# Patient Record
Sex: MALE | Race: ASIAN | NOT HISPANIC OR LATINO | URBAN - METROPOLITAN AREA
[De-identification: names, ages, dates, MRNs, and addresses within clinical notes are randomized per-mention and may not be internally consistent; named-entity substitution may affect disease eponyms.]

---

## 2018-09-22 ENCOUNTER — EMERGENCY (EMERGENCY)
Facility: HOSPITAL | Age: 73
LOS: 1 days | Discharge: HOME | End: 2018-09-22
Attending: EMERGENCY MEDICINE | Admitting: INTERNAL MEDICINE
Payer: MEDICAID

## 2018-09-22 VITALS
HEART RATE: 92 BPM | SYSTOLIC BLOOD PRESSURE: 142 MMHG | DIASTOLIC BLOOD PRESSURE: 70 MMHG | TEMPERATURE: 98 F | OXYGEN SATURATION: 95 % | RESPIRATION RATE: 20 BRPM

## 2018-09-22 DIAGNOSIS — Z29.9 ENCOUNTER FOR PROPHYLACTIC MEASURES, UNSPECIFIED: ICD-10-CM

## 2018-09-22 DIAGNOSIS — I10 ESSENTIAL (PRIMARY) HYPERTENSION: ICD-10-CM

## 2018-09-22 DIAGNOSIS — N40.0 BENIGN PROSTATIC HYPERPLASIA WITHOUT LOWER URINARY TRACT SYMPTOMS: ICD-10-CM

## 2018-09-22 DIAGNOSIS — K82.9 DISEASE OF GALLBLADDER, UNSPECIFIED: Chronic | ICD-10-CM

## 2018-09-22 DIAGNOSIS — J45.909 UNSPECIFIED ASTHMA, UNCOMPLICATED: ICD-10-CM

## 2018-09-22 DIAGNOSIS — Z79.899 OTHER LONG TERM (CURRENT) DRUG THERAPY: ICD-10-CM

## 2018-09-22 DIAGNOSIS — J44.1 CHRONIC OBSTRUCTIVE PULMONARY DISEASE WITH (ACUTE) EXACERBATION: ICD-10-CM

## 2018-09-22 DIAGNOSIS — L29.9 PRURITUS, UNSPECIFIED: ICD-10-CM

## 2018-09-22 DIAGNOSIS — M25.561 PAIN IN RIGHT KNEE: ICD-10-CM

## 2018-09-22 DIAGNOSIS — E11.36 TYPE 2 DIABETES MELLITUS WITH DIABETIC CATARACT: ICD-10-CM

## 2018-09-22 LAB
ALBUMIN SERPL ELPH-MCNC: 3.6 G/DL — SIGNIFICANT CHANGE UP (ref 3.3–5)
ALP SERPL-CCNC: 94 U/L — SIGNIFICANT CHANGE UP (ref 40–120)
ALT FLD-CCNC: 11 U/L — SIGNIFICANT CHANGE UP (ref 4–41)
APPEARANCE UR: CLEAR — SIGNIFICANT CHANGE UP
APTT BLD: 36.2 SEC — SIGNIFICANT CHANGE UP (ref 27.5–37.4)
AST SERPL-CCNC: 9 U/L — SIGNIFICANT CHANGE UP (ref 4–40)
B PERT DNA SPEC QL NAA+PROBE: SIGNIFICANT CHANGE UP
BASE EXCESS BLDV CALC-SCNC: 0.5 MMOL/L — SIGNIFICANT CHANGE UP
BASOPHILS # BLD AUTO: 0.07 K/UL — SIGNIFICANT CHANGE UP (ref 0–0.2)
BASOPHILS NFR BLD AUTO: 0.7 % — SIGNIFICANT CHANGE UP (ref 0–2)
BILIRUB SERPL-MCNC: < 0.2 MG/DL — LOW (ref 0.2–1.2)
BILIRUB UR-MCNC: NEGATIVE — SIGNIFICANT CHANGE UP
BLOOD GAS VENOUS - CREATININE: 0.83 MG/DL — SIGNIFICANT CHANGE UP (ref 0.5–1.3)
BLOOD UR QL VISUAL: NEGATIVE — SIGNIFICANT CHANGE UP
BUN SERPL-MCNC: 11 MG/DL — SIGNIFICANT CHANGE UP (ref 7–23)
C PNEUM DNA SPEC QL NAA+PROBE: NOT DETECTED — SIGNIFICANT CHANGE UP
CALCIUM SERPL-MCNC: 9.1 MG/DL — SIGNIFICANT CHANGE UP (ref 8.4–10.5)
CHLORIDE BLDV-SCNC: 99 MMOL/L — SIGNIFICANT CHANGE UP (ref 96–108)
CHLORIDE SERPL-SCNC: 98 MMOL/L — SIGNIFICANT CHANGE UP (ref 98–107)
CO2 SERPL-SCNC: 22 MMOL/L — SIGNIFICANT CHANGE UP (ref 22–31)
COLOR SPEC: COLORLESS — SIGNIFICANT CHANGE UP
CREAT SERPL-MCNC: 0.99 MG/DL — SIGNIFICANT CHANGE UP (ref 0.5–1.3)
EOSINOPHIL # BLD AUTO: 0.48 K/UL — SIGNIFICANT CHANGE UP (ref 0–0.5)
EOSINOPHIL NFR BLD AUTO: 5 % — SIGNIFICANT CHANGE UP (ref 0–6)
FLUAV H1 2009 PAND RNA SPEC QL NAA+PROBE: NOT DETECTED — SIGNIFICANT CHANGE UP
FLUAV H1 RNA SPEC QL NAA+PROBE: NOT DETECTED — SIGNIFICANT CHANGE UP
FLUAV H3 RNA SPEC QL NAA+PROBE: NOT DETECTED — SIGNIFICANT CHANGE UP
FLUAV SUBTYP SPEC NAA+PROBE: SIGNIFICANT CHANGE UP
FLUBV RNA SPEC QL NAA+PROBE: NOT DETECTED — SIGNIFICANT CHANGE UP
GAS PNL BLDV: 134 MMOL/L — LOW (ref 136–146)
GLUCOSE BLDV-MCNC: 274 — HIGH (ref 70–99)
GLUCOSE SERPL-MCNC: 270 MG/DL — HIGH (ref 70–99)
GLUCOSE UR-MCNC: 150 — HIGH
HADV DNA SPEC QL NAA+PROBE: NOT DETECTED — SIGNIFICANT CHANGE UP
HCO3 BLDV-SCNC: 24 MMOL/L — SIGNIFICANT CHANGE UP (ref 20–27)
HCOV 229E RNA SPEC QL NAA+PROBE: NOT DETECTED — SIGNIFICANT CHANGE UP
HCOV HKU1 RNA SPEC QL NAA+PROBE: NOT DETECTED — SIGNIFICANT CHANGE UP
HCOV NL63 RNA SPEC QL NAA+PROBE: NOT DETECTED — SIGNIFICANT CHANGE UP
HCOV OC43 RNA SPEC QL NAA+PROBE: NOT DETECTED — SIGNIFICANT CHANGE UP
HCT VFR BLD CALC: 34.1 % — LOW (ref 39–50)
HCT VFR BLDV CALC: 34.5 % — LOW (ref 39–51)
HGB BLD-MCNC: 10.2 G/DL — LOW (ref 13–17)
HGB BLDV-MCNC: 11.2 G/DL — LOW (ref 13–17)
HMPV RNA SPEC QL NAA+PROBE: NOT DETECTED — SIGNIFICANT CHANGE UP
HPIV1 RNA SPEC QL NAA+PROBE: NOT DETECTED — SIGNIFICANT CHANGE UP
HPIV2 RNA SPEC QL NAA+PROBE: NOT DETECTED — SIGNIFICANT CHANGE UP
HPIV3 RNA SPEC QL NAA+PROBE: NOT DETECTED — SIGNIFICANT CHANGE UP
HPIV4 RNA SPEC QL NAA+PROBE: NOT DETECTED — SIGNIFICANT CHANGE UP
IMM GRANULOCYTES # BLD AUTO: 0.04 # — SIGNIFICANT CHANGE UP
IMM GRANULOCYTES NFR BLD AUTO: 0.4 % — SIGNIFICANT CHANGE UP (ref 0–1.5)
INR BLD: 0.86 — LOW (ref 0.88–1.17)
KETONES UR-MCNC: NEGATIVE — SIGNIFICANT CHANGE UP
LACTATE BLDV-MCNC: 2.1 MMOL/L — HIGH (ref 0.5–2)
LEUKOCYTE ESTERASE UR-ACNC: NEGATIVE — SIGNIFICANT CHANGE UP
LYMPHOCYTES # BLD AUTO: 2.38 K/UL — SIGNIFICANT CHANGE UP (ref 1–3.3)
LYMPHOCYTES # BLD AUTO: 25 % — SIGNIFICANT CHANGE UP (ref 13–44)
M PNEUMO DNA SPEC QL NAA+PROBE: NOT DETECTED — SIGNIFICANT CHANGE UP
MCHC RBC-ENTMCNC: 21.9 PG — LOW (ref 27–34)
MCHC RBC-ENTMCNC: 29.9 % — LOW (ref 32–36)
MCV RBC AUTO: 73.3 FL — LOW (ref 80–100)
MONOCYTES # BLD AUTO: 0.92 K/UL — HIGH (ref 0–0.9)
MONOCYTES NFR BLD AUTO: 9.7 % — SIGNIFICANT CHANGE UP (ref 2–14)
NEUTROPHILS # BLD AUTO: 5.64 K/UL — SIGNIFICANT CHANGE UP (ref 1.8–7.4)
NEUTROPHILS NFR BLD AUTO: 59.2 % — SIGNIFICANT CHANGE UP (ref 43–77)
NITRITE UR-MCNC: NEGATIVE — SIGNIFICANT CHANGE UP
NRBC # FLD: 0 — SIGNIFICANT CHANGE UP
PCO2 BLDV: 46 MMHG — SIGNIFICANT CHANGE UP (ref 41–51)
PH BLDV: 7.36 PH — SIGNIFICANT CHANGE UP (ref 7.32–7.43)
PH UR: 6.5 — SIGNIFICANT CHANGE UP (ref 5–8)
PLATELET # BLD AUTO: 350 K/UL — SIGNIFICANT CHANGE UP (ref 150–400)
PMV BLD: 10 FL — SIGNIFICANT CHANGE UP (ref 7–13)
PO2 BLDV: 33 MMHG — LOW (ref 35–40)
POTASSIUM BLDV-SCNC: 5.7 MMOL/L — HIGH (ref 3.4–4.5)
POTASSIUM SERPL-MCNC: 4.5 MMOL/L — SIGNIFICANT CHANGE UP (ref 3.5–5.3)
POTASSIUM SERPL-SCNC: 4.5 MMOL/L — SIGNIFICANT CHANGE UP (ref 3.5–5.3)
PROT SERPL-MCNC: 7.4 G/DL — SIGNIFICANT CHANGE UP (ref 6–8.3)
PROT UR-MCNC: NEGATIVE — SIGNIFICANT CHANGE UP
PROTHROM AB SERPL-ACNC: 9.9 SEC — SIGNIFICANT CHANGE UP (ref 9.8–13.1)
RBC # BLD: 4.65 M/UL — SIGNIFICANT CHANGE UP (ref 4.2–5.8)
RBC # FLD: 15.4 % — HIGH (ref 10.3–14.5)
RSV RNA SPEC QL NAA+PROBE: NOT DETECTED — SIGNIFICANT CHANGE UP
RV+EV RNA SPEC QL NAA+PROBE: POSITIVE — HIGH
SAO2 % BLDV: 55.6 % — LOW (ref 60–85)
SODIUM SERPL-SCNC: 135 MMOL/L — SIGNIFICANT CHANGE UP (ref 135–145)
SP GR SPEC: 1.01 — SIGNIFICANT CHANGE UP (ref 1–1.04)
TROPONIN T, HIGH SENSITIVITY: 12 NG/L — SIGNIFICANT CHANGE UP (ref ?–14)
TROPONIN T, HIGH SENSITIVITY: 16 NG/L — SIGNIFICANT CHANGE UP (ref ?–14)
UROBILINOGEN FLD QL: NORMAL — SIGNIFICANT CHANGE UP
WBC # BLD: 9.53 K/UL — SIGNIFICANT CHANGE UP (ref 3.8–10.5)
WBC # FLD AUTO: 9.53 K/UL — SIGNIFICANT CHANGE UP (ref 3.8–10.5)

## 2018-09-22 PROCEDURE — 99285 EMERGENCY DEPT VISIT HI MDM: CPT

## 2018-09-22 RX ORDER — SPIRONOLACTONE 25 MG/1
50 TABLET, FILM COATED ORAL DAILY
Qty: 0 | Refills: 0 | Status: DISCONTINUED | OUTPATIENT
Start: 2018-09-22 | End: 2018-09-23

## 2018-09-22 RX ORDER — SODIUM CHLORIDE 9 MG/ML
1000 INJECTION, SOLUTION INTRAVENOUS
Qty: 0 | Refills: 0 | Status: DISCONTINUED | OUTPATIENT
Start: 2018-09-22 | End: 2018-09-23

## 2018-09-22 RX ORDER — INSULIN LISPRO 100/ML
VIAL (ML) SUBCUTANEOUS AT BEDTIME
Qty: 0 | Refills: 0 | Status: DISCONTINUED | OUTPATIENT
Start: 2018-09-22 | End: 2018-09-23

## 2018-09-22 RX ORDER — INSULIN LISPRO 100/ML
VIAL (ML) SUBCUTANEOUS
Qty: 0 | Refills: 0 | Status: DISCONTINUED | OUTPATIENT
Start: 2018-09-22 | End: 2018-09-23

## 2018-09-22 RX ORDER — LOSARTAN POTASSIUM 100 MG/1
100 TABLET, FILM COATED ORAL DAILY
Qty: 0 | Refills: 0 | Status: DISCONTINUED | OUTPATIENT
Start: 2018-09-22 | End: 2018-09-23

## 2018-09-22 RX ORDER — FUROSEMIDE 40 MG
40 TABLET ORAL DAILY
Qty: 0 | Refills: 0 | Status: DISCONTINUED | OUTPATIENT
Start: 2018-09-22 | End: 2018-09-23

## 2018-09-22 RX ORDER — AZITHROMYCIN 500 MG/1
500 TABLET, FILM COATED ORAL EVERY 24 HOURS
Qty: 0 | Refills: 0 | Status: DISCONTINUED | OUTPATIENT
Start: 2018-09-23 | End: 2018-09-23

## 2018-09-22 RX ORDER — LORATADINE 10 MG/1
10 TABLET ORAL ONCE
Qty: 0 | Refills: 0 | Status: COMPLETED | OUTPATIENT
Start: 2018-09-22 | End: 2018-09-22

## 2018-09-22 RX ORDER — PANTOPRAZOLE SODIUM 20 MG/1
40 TABLET, DELAYED RELEASE ORAL
Qty: 0 | Refills: 0 | Status: DISCONTINUED | OUTPATIENT
Start: 2018-09-22 | End: 2018-09-23

## 2018-09-22 RX ORDER — INFLUENZA VIRUS VACCINE 15; 15; 15; 15 UG/.5ML; UG/.5ML; UG/.5ML; UG/.5ML
0.5 SUSPENSION INTRAMUSCULAR ONCE
Qty: 0 | Refills: 0 | Status: COMPLETED | OUTPATIENT
Start: 2018-09-22 | End: 2018-09-23

## 2018-09-22 RX ORDER — IPRATROPIUM/ALBUTEROL SULFATE 18-103MCG
3 AEROSOL WITH ADAPTER (GRAM) INHALATION
Qty: 0 | Refills: 0 | Status: COMPLETED | OUTPATIENT
Start: 2018-09-22 | End: 2018-09-22

## 2018-09-22 RX ORDER — CEFTRIAXONE 500 MG/1
1 INJECTION, POWDER, FOR SOLUTION INTRAMUSCULAR; INTRAVENOUS ONCE
Qty: 0 | Refills: 0 | Status: COMPLETED | OUTPATIENT
Start: 2018-09-22 | End: 2018-09-22

## 2018-09-22 RX ORDER — LORATADINE 10 MG/1
10 TABLET ORAL DAILY
Qty: 0 | Refills: 0 | Status: DISCONTINUED | OUTPATIENT
Start: 2018-09-22 | End: 2018-09-23

## 2018-09-22 RX ORDER — TAMSULOSIN HYDROCHLORIDE 0.4 MG/1
0.4 CAPSULE ORAL AT BEDTIME
Qty: 0 | Refills: 0 | Status: DISCONTINUED | OUTPATIENT
Start: 2018-09-22 | End: 2018-09-23

## 2018-09-22 RX ORDER — DEXTROSE 50 % IN WATER 50 %
15 SYRINGE (ML) INTRAVENOUS ONCE
Qty: 0 | Refills: 0 | Status: DISCONTINUED | OUTPATIENT
Start: 2018-09-22 | End: 2018-09-23

## 2018-09-22 RX ORDER — DEXTROSE 50 % IN WATER 50 %
25 SYRINGE (ML) INTRAVENOUS ONCE
Qty: 0 | Refills: 0 | Status: DISCONTINUED | OUTPATIENT
Start: 2018-09-22 | End: 2018-09-23

## 2018-09-22 RX ORDER — BUDESONIDE AND FORMOTEROL FUMARATE DIHYDRATE 160; 4.5 UG/1; UG/1
2 AEROSOL RESPIRATORY (INHALATION)
Qty: 0 | Refills: 0 | Status: DISCONTINUED | OUTPATIENT
Start: 2018-09-22 | End: 2018-09-23

## 2018-09-22 RX ORDER — GLUCAGON INJECTION, SOLUTION 0.5 MG/.1ML
1 INJECTION, SOLUTION SUBCUTANEOUS ONCE
Qty: 0 | Refills: 0 | Status: DISCONTINUED | OUTPATIENT
Start: 2018-09-22 | End: 2018-09-23

## 2018-09-22 RX ORDER — IPRATROPIUM/ALBUTEROL SULFATE 18-103MCG
3 AEROSOL WITH ADAPTER (GRAM) INHALATION EVERY 6 HOURS
Qty: 0 | Refills: 0 | Status: DISCONTINUED | OUTPATIENT
Start: 2018-09-22 | End: 2018-09-23

## 2018-09-22 RX ORDER — DEXTROSE 50 % IN WATER 50 %
12.5 SYRINGE (ML) INTRAVENOUS ONCE
Qty: 0 | Refills: 0 | Status: DISCONTINUED | OUTPATIENT
Start: 2018-09-22 | End: 2018-09-23

## 2018-09-22 RX ORDER — CEFTRIAXONE 500 MG/1
1 INJECTION, POWDER, FOR SOLUTION INTRAMUSCULAR; INTRAVENOUS EVERY 24 HOURS
Qty: 0 | Refills: 0 | Status: DISCONTINUED | OUTPATIENT
Start: 2018-09-23 | End: 2018-09-23

## 2018-09-22 RX ORDER — INSULIN GLARGINE 100 [IU]/ML
9 INJECTION, SOLUTION SUBCUTANEOUS AT BEDTIME
Qty: 0 | Refills: 0 | Status: DISCONTINUED | OUTPATIENT
Start: 2018-09-22 | End: 2018-09-23

## 2018-09-22 RX ORDER — MONTELUKAST 4 MG/1
10 TABLET, CHEWABLE ORAL DAILY
Qty: 0 | Refills: 0 | Status: DISCONTINUED | OUTPATIENT
Start: 2018-09-22 | End: 2018-09-23

## 2018-09-22 RX ORDER — AZITHROMYCIN 500 MG/1
500 TABLET, FILM COATED ORAL ONCE
Qty: 0 | Refills: 0 | Status: COMPLETED | OUTPATIENT
Start: 2018-09-22 | End: 2018-09-22

## 2018-09-22 RX ADMIN — CEFTRIAXONE 1 GRAM(S): 500 INJECTION, POWDER, FOR SOLUTION INTRAMUSCULAR; INTRAVENOUS at 15:04

## 2018-09-22 RX ADMIN — Medication 3 MILLILITER(S): at 13:30

## 2018-09-22 RX ADMIN — LORATADINE 10 MILLIGRAM(S): 10 TABLET ORAL at 23:05

## 2018-09-22 RX ADMIN — Medication 3 MILLILITER(S): at 13:44

## 2018-09-22 RX ADMIN — Medication 3: at 22:52

## 2018-09-22 RX ADMIN — Medication 3 MILLILITER(S): at 13:11

## 2018-09-22 RX ADMIN — Medication 125 MILLIGRAM(S): at 13:30

## 2018-09-22 RX ADMIN — INSULIN GLARGINE 9 UNIT(S): 100 INJECTION, SOLUTION SUBCUTANEOUS at 22:52

## 2018-09-22 RX ADMIN — AZITHROMYCIN 500 MILLIGRAM(S): 500 TABLET, FILM COATED ORAL at 16:30

## 2018-09-22 RX ADMIN — CEFTRIAXONE 100 GRAM(S): 500 INJECTION, POWDER, FOR SOLUTION INTRAMUSCULAR; INTRAVENOUS at 14:33

## 2018-09-22 RX ADMIN — AZITHROMYCIN 250 MILLIGRAM(S): 500 TABLET, FILM COATED ORAL at 15:30

## 2018-09-22 NOTE — H&P ADULT - NSHPPHYSICALEXAM_GEN_ALL_CORE
PHYSICAL EXAM:      Constitutional: NAD, well-groomed, well-developed  HEENT: EOMI, Normal Hearing  Neck: No LAD, No JVD  Back: Normal spine flexure, No CVA tenderness  Respiratory: CTAB  Cardiovascular: S1 and S2, RRR  Gastrointestinal: BS+, soft, NT/ND  Extremities: 1+ peripheral edema  Vascular: 2+ peripheral pulses  Neurological: A/O x 3, no focal deficits  Psychiatric: Normal mood, normal affect  Musculoskeletal: 5/5 strength b/l upper and lower extremities  Skin: mild pustular rash on posterior right leg

## 2018-09-22 NOTE — H&P ADULT - NSHPREVIEWOFSYSTEMS_GEN_ALL_CORE
Review of Systems:   CONSTITUTIONAL: No fever, weight loss  EYES: No eye pain, visual disturbances, or discharge  ENMT:  No difficulty hearing, tinnitus, vertigo; No sinus or throat pain  NECK: No pain or stiffness  RESPIRATORY: + cough, wheezing, No chills or hemoptysis; + shortness of breath  CARDIOVASCULAR: No chest pain, palpitations, dizziness, or leg swelling  GASTROINTESTINAL: No abdominal or epigastric pain. No nausea, vomiting, or hematemesis; No diarrhea or constipation. No melena or hematochezia.  GENITOURINARY: Chronic low-pressure stream/ incomplete bladder emptying  at times  NEUROLOGICAL: No headaches, memory loss, loss of strength, numbness, or tremors  SKIN :itching at right calf,  MUSCULOSKELETAL: chronic BL knee pain

## 2018-09-22 NOTE — H&P ADULT - PROBLEM SELECTOR PLAN 3
-c/w arb  -states he takes spironolactone and lasix for leg swelling; will continue as tolerated and order TTE

## 2018-09-22 NOTE — H&P ADULT - PROBLEM SELECTOR PLAN 1
-c/w nebs, abx, steroids  -c/w inhaled beta agon/steroids, Singulair  -pt on doxophylline and oral ketotifen for copd which is not carried by pharmacy  -pt will need to establish outpt care with pulmonologist and pcp as pt is coming from [facility] to live here -c/w nebs, abx, steroids  -c/w inhaled beta agon/steroids, Singulair  -pt on doxophylline and oral ketotifen for copd which is not carried by pharmacy and likely not available in [place] altogether   -pt will need to establish outpt care with pulmonologist and pcp as pt is coming from [facility] to live here

## 2018-09-22 NOTE — H&P ADULT - NSHPSOCIALHISTORY_GEN_ALL_CORE
former smoker  recently arrived from [facility] to live in [place], English speaking son at bedside

## 2018-09-22 NOTE — ED ADULT NURSE NOTE - OBJECTIVE STATEMENT
patient alert ox3 speaks Macedonian and Divehi, came in c/o cough and SOB on exertion . denies cp. not in any distress. patient recently came from [facility] one week ago. not in any distress,. family by bed side translating. labs done as ordered. awaiting results and re eval.

## 2018-09-22 NOTE — H&P ADULT - HISTORY OF PRESENT ILLNESS
74 yo Japanese speaking M who came from [facility] last week [place] live in [place]. Pt with h/o obesity, DM, BPH, leg swelling, copd/ former 30 pack year smoker who reports  quitting  about 20 years ago,   Pt reports 2 days of productive cough, sob. RVP + for entero/ rhinovirus, cxr prelim negative. Denies fevers, rigors, hemoptysis. Denies recent sick contacts. Reports significant symptomatic improvement following duoneb x1, steroids, abx all given at least 8 hours prior to my exam

## 2018-09-22 NOTE — H&P ADULT - PROBLEM SELECTOR PLAN 7
IMPROVE VTE Individual Risk Assessment    RISK                                                          Points  [] Previous VTE                                           3  [] Thrombophilia                                        2  [] Lower limb paralysis                              2   [] Current Cancer                                       2   [] Immobilization > 24 hrs                        1  [] ICU/CCU stay > 24 hours                       1  [x] Age > 60                                                   1    IMPROVE VTE Score: 1  lovenox

## 2018-09-22 NOTE — ED PROVIDER NOTE - ATTENDING CONTRIBUTION TO CARE
Attending Statement: I have reviewed and agree with all pertinent clinical information, including history and physical exam and agree with treatment plan of the PA, except as noted.  72yo M with sons at bedside for translation.  pt came from [facility] last week. Hx of HTN, HLD, Asthma pw SOB and cough x 2 days.   +productive cough, no hemoptysis. no hx of fever/chills. +sob hx of asthma last hosp a year ago. no intubation or ICU admission. Ex smoker. not on home oxygen. no chest pain. no abdominal pain. no vomit or diarrhea. hx of constipation. hx of chronic knee pain, left greater than right. no new trauma. no calf pain. no hx of PE or DVT.   Vital signs noted. pt sitting up, coughing. 95 RA, normal S1-S2 +coarse bs w end exp wheeze bs. no retractions. soft obese male nt abdomen. no pitting edema bl. no calf tenderness. AO3 w sons at bedside  plan duoneb, steroids, IV abx, cxr, labs, ekg, rvp, admit, re assess

## 2018-09-22 NOTE — ED PROVIDER NOTE - OBJECTIVE STATEMENT
73 y.o male pmhx of DM, asthma here with productive cough with white sputum since yesterday with associated SOB. Pt recently returned from [facility]. Denies fevers, chills, chest pain, abdominal pain, n/v/d, urinary symptoms. 73 y.o male pmhx of DM, asthma(on multiple inhalers) here with productive cough with white sputum since yesterday with associated SOB. Pt recently returned from [facility] last week. Denies fevers, chills, chest pain, hemoptysis, abdominal pain, n/v/d, urinary symptoms.   Prefers family at beside to translate , offered .

## 2018-09-22 NOTE — H&P ADULT - PMH
Benign prostatic hyperplasia, unspecified whether lower urinary tract symptoms present    Chronic obstructive pulmonary disease, unspecified COPD type    Essential hypertension    Type 2 diabetes mellitus with complication, unspecified whether long term insulin use

## 2018-09-22 NOTE — H&P ADULT - PROBLEM SELECTOR PLAN 8
med rec based on physical meds provided by son; includes doxophylline and oral ketotifen for asthma which I cant include as they are not in med rec system

## 2018-09-22 NOTE — ED PROVIDER NOTE - PROGRESS NOTE DETAILS
pt looks more comfortable w family. pending repeat trop and admission for asthma exacerbation/bronchitis. Plan dw w pt and sons.  Endorsed to Dr Echevarria. monique mercado: pt doing better, + enterovirus, trop stable. accepted by pulm fellow to PCU unit.

## 2018-09-22 NOTE — H&P ADULT - NSHPLABSRESULTS_GEN_ALL_CORE
Vital Signs Last 24 Hrs  T(C): 37.1 (22 Sep 2018 21:16), Max: 37.1 (22 Sep 2018 13:20)  T(F): 98.7 (22 Sep 2018 21:16), Max: 98.8 (22 Sep 2018 13:20)  HR: 97 (22 Sep 2018 21:16) (86 - 112)  BP: 147/86 (22 Sep 2018 21:16) (128/50 - 186/84)  BP(mean): --  RR: 19 (22 Sep 2018 21:16) (19 - 25)  SpO2: 98% (22 Sep 2018 21:16) (95% - 98%)                            10.2   9.53  )-----------( 350      ( 22 Sep 2018 11:45 )             34.1     -    135  |  98  |  11  ----------------------------<  270<H>  4.5   |  22  |  0.99    Ca    9.1      22 Sep 2018 11:45    TPro  7.4  /  Alb  3.6  /  TBili  < 0.2<L>  /  DBili  x   /  AST  9   /  ALT  11  /  AlkPhos  94  -    CAPILLARY BLOOD GLUCOSE      POCT Blood Glucose.: 366 mg/dL (22 Sep 2018 21:54)    PT/INR - ( 22 Sep 2018 11:45 )   PT: 9.9 SEC;   INR: 0.86          PTT - ( 22 Sep 2018 11:45 )  PTT:36.2 SEC  Urinalysis Basic - ( 22 Sep 2018 13:45 )    Color: COLORLESS / Appearance: CLEAR / S.008 / pH: 6.5  Gluc: 150 / Ketone: NEGATIVE  / Bili: NEGATIVE / Urobili: NORMAL   Blood: NEGATIVE / Protein: NEGATIVE / Nitrite: NEGATIVE   Leuk Esterase: NEGATIVE / RBC: x / WBC x   Sq Epi: x / Non Sq Epi: x / Bacteria: x

## 2018-09-22 NOTE — H&P ADULT - PROBLEM SELECTOR PLAN 2
-reports taking insulin 18 in morning and 24 evening; unsure of name or if a mixture  -will place on lantus and ISS for now pending clarification and response to our regimen

## 2018-09-22 NOTE — ED ADULT TRIAGE NOTE - CHIEF COMPLAINT QUOTE
Came from [facility] 1 week ago, over past 3 days has been having SOB and cough. Hx: DM, HTN, COPD, states he is running out of his medication. Denies CP. Respirations even/unlabored.

## 2018-09-22 NOTE — ED ADULT NURSE NOTE - CHIEF COMPLAINT QUOTE
Came from [facility] 1 week ago, over past 3 days has been having SOB and cough. Hx: DM, HTN, COPD, states he is running out of his medication. Denies CP. Respirations even/unlabored.

## 2018-09-23 VITALS
DIASTOLIC BLOOD PRESSURE: 78 MMHG | TEMPERATURE: 98 F | RESPIRATION RATE: 18 BRPM | OXYGEN SATURATION: 97 % | HEART RATE: 98 BPM | SYSTOLIC BLOOD PRESSURE: 129 MMHG

## 2018-09-23 DIAGNOSIS — J45.909 UNSPECIFIED ASTHMA, UNCOMPLICATED: ICD-10-CM

## 2018-09-23 LAB
BACTERIA UR CULT: SIGNIFICANT CHANGE UP
CHOLEST SERPL-MCNC: 224 MG/DL — HIGH (ref 120–199)
GLUCOSE BLDC GLUCOMTR-MCNC: 333 MG/DL — HIGH (ref 70–99)
GLUCOSE BLDC GLUCOMTR-MCNC: 386 MG/DL — HIGH (ref 70–99)
GLUCOSE BLDC GLUCOMTR-MCNC: 397 MG/DL — HIGH (ref 70–99)
HBA1C BLD-MCNC: 9.8 % — HIGH (ref 4–5.6)
HCT VFR BLD CALC: 33.9 % — LOW (ref 39–50)
HDLC SERPL-MCNC: 48 MG/DL — SIGNIFICANT CHANGE UP (ref 35–55)
HGB BLD-MCNC: 10.3 G/DL — LOW (ref 13–17)
LIPID PNL WITH DIRECT LDL SERPL: 178 MG/DL — SIGNIFICANT CHANGE UP
MCHC RBC-ENTMCNC: 21.8 PG — LOW (ref 27–34)
MCHC RBC-ENTMCNC: 30.4 % — LOW (ref 32–36)
MCV RBC AUTO: 71.7 FL — LOW (ref 80–100)
NRBC # FLD: 0 — SIGNIFICANT CHANGE UP
PLATELET # BLD AUTO: 359 K/UL — SIGNIFICANT CHANGE UP (ref 150–400)
PMV BLD: 10.4 FL — SIGNIFICANT CHANGE UP (ref 7–13)
RBC # BLD: 4.73 M/UL — SIGNIFICANT CHANGE UP (ref 4.2–5.8)
RBC # FLD: 14.9 % — HIGH (ref 10.3–14.5)
SPECIMEN SOURCE: SIGNIFICANT CHANGE UP
TRIGL SERPL-MCNC: 115 MG/DL — SIGNIFICANT CHANGE UP (ref 10–149)
WBC # BLD: 11.44 K/UL — HIGH (ref 3.8–10.5)
WBC # FLD AUTO: 11.44 K/UL — HIGH (ref 3.8–10.5)

## 2018-09-23 RX ORDER — FLUTICASONE PROPIONATE AND SALMETEROL 50; 250 UG/1; UG/1
1 POWDER ORAL; RESPIRATORY (INHALATION)
Qty: 1 | Refills: 0 | OUTPATIENT
Start: 2018-09-23 | End: 2018-10-22

## 2018-09-23 RX ORDER — OLMESARTAN MEDOXOMIL 5 MG/1
1 TABLET, FILM COATED ORAL
Qty: 30 | Refills: 0 | OUTPATIENT
Start: 2018-09-23 | End: 2018-10-22

## 2018-09-23 RX ORDER — MONTELUKAST 4 MG/1
1 TABLET, CHEWABLE ORAL
Qty: 30 | Refills: 0 | OUTPATIENT
Start: 2018-09-23 | End: 2018-10-22

## 2018-09-23 RX ORDER — SPIRONOLACTONE 25 MG/1
1 TABLET, FILM COATED ORAL
Qty: 30 | Refills: 0 | OUTPATIENT
Start: 2018-09-23 | End: 2018-10-22

## 2018-09-23 RX ORDER — FUROSEMIDE 40 MG
1 TABLET ORAL
Qty: 30 | Refills: 0 | OUTPATIENT
Start: 2018-09-23 | End: 2018-10-22

## 2018-09-23 RX ORDER — LORATADINE 10 MG/1
1 TABLET ORAL
Qty: 0 | Refills: 0 | COMMUNITY
Start: 2018-09-23

## 2018-09-23 RX ORDER — TAMSULOSIN HYDROCHLORIDE 0.4 MG/1
1 CAPSULE ORAL
Qty: 30 | Refills: 0 | OUTPATIENT
Start: 2018-09-23 | End: 2018-10-22

## 2018-09-23 RX ORDER — INSULIN GLARGINE 100 [IU]/ML
18 INJECTION, SOLUTION SUBCUTANEOUS
Qty: 10 | Refills: 0 | OUTPATIENT
Start: 2018-09-23 | End: 2018-10-22

## 2018-09-23 RX ADMIN — Medication 40 MILLIGRAM(S): at 05:52

## 2018-09-23 RX ADMIN — Medication 3 MILLILITER(S): at 16:25

## 2018-09-23 RX ADMIN — CEFTRIAXONE 100 GRAM(S): 500 INJECTION, POWDER, FOR SOLUTION INTRAMUSCULAR; INTRAVENOUS at 00:48

## 2018-09-23 RX ADMIN — PANTOPRAZOLE SODIUM 40 MILLIGRAM(S): 20 TABLET, DELAYED RELEASE ORAL at 05:52

## 2018-09-23 RX ADMIN — AZITHROMYCIN 250 MILLIGRAM(S): 500 TABLET, FILM COATED ORAL at 00:48

## 2018-09-23 RX ADMIN — Medication 5: at 12:23

## 2018-09-23 RX ADMIN — LOSARTAN POTASSIUM 100 MILLIGRAM(S): 100 TABLET, FILM COATED ORAL at 05:52

## 2018-09-23 RX ADMIN — INFLUENZA VIRUS VACCINE 0.5 MILLILITER(S): 15; 15; 15; 15 SUSPENSION INTRAMUSCULAR at 18:26

## 2018-09-23 RX ADMIN — Medication 3 MILLILITER(S): at 02:46

## 2018-09-23 RX ADMIN — Medication 3 MILLILITER(S): at 10:30

## 2018-09-23 RX ADMIN — SPIRONOLACTONE 50 MILLIGRAM(S): 25 TABLET, FILM COATED ORAL at 06:44

## 2018-09-23 RX ADMIN — Medication 5: at 18:37

## 2018-09-23 RX ADMIN — Medication 4: at 08:13

## 2018-09-23 NOTE — DISCHARGE NOTE ADULT - MEDICATION SUMMARY - MEDICATIONS TO TAKE
I will START or STAY ON the medications listed below when I get home from the hospital:    predniSONE 20 mg oral tablet  -- 2 tab(s) by mouth once a day  -- Indication: For Asthma    spironolactone 50 mg oral tablet  -- 1 tab(s) by mouth once a day  -- Indication: For Essential hypertension    olmesartan 40 mg oral tablet  -- 1 tab(s) by mouth once a day  -- Indication: For Essential hypertension    tamsulosin 0.4 mg oral capsule  -- 1 cap(s) by mouth once a day  -- Indication: For Benign prostatic hyperplasia, unspecified whether lower urinary tract symptoms present    Lantus 100 units/mL subcutaneous solution  -- 18 unit(s) subcutaneous once a day (at bedtime)   -- Do not drink alcoholic beverages when taking this medication.  It is very important that you take or use this exactly as directed.  Do not skip doses or discontinue unless directed by your doctor.  Keep in refrigerator.  Do not freeze.    -- Indication: For Type 2 diabetes mellitus with complication, unspecified whether long term insulin use    loratadine 10 mg oral tablet, disintegrating  -- 1 tab(s) by mouth once a day, As needed, pruritus  -- Indication: For Asthma    Advair Diskus 250 mcg-50 mcg inhalation powder  -- 1 puff(s) inhaled 2 times a day   -- Check with your doctor before becoming pregnant.  For inhalation only.  Obtain medical advice before taking any non-prescription drugs as some may affect the action of this medication.  Rinse mouth thoroughly after use.    -- Indication: For Asthma    Lasix 40 mg oral tablet  -- 1 tab(s) by mouth once a day   -- Avoid prolonged or excessive exposure to direct and/or artificial sunlight while taking this medication.  It is very important that you take or use this exactly as directed.  Do not skip doses or discontinue unless directed by your doctor.  It may be advisable to drink a full glass orange juice or eat a banana daily while taking this medication.    -- Indication: For Essential hypertension    Singulair 10 mg oral tablet  -- 1 tab(s) by mouth once a day  -- Indication: For Asthma    NexIUM  -- 20 milligram(s) by mouth once a day  -- Indication: For Gerd

## 2018-09-23 NOTE — DISCHARGE NOTE ADULT - CARE PROVIDERS DIRECT ADDRESSES
[email or web address]

## 2018-09-23 NOTE — DISCHARGE NOTE ADULT - ADDITIONAL INSTRUCTIONS
Follow up with primary care provider in 1-2 weeks  Follow up with pulmonologist in 1-2 weeks Follow up with primary care provider in 1-2 weeks. Consider an echocardiogram.  Follow up with pulmonologist in 1-2 weeks

## 2018-09-23 NOTE — DISCHARGE NOTE ADULT - PATIENT PORTAL LINK FT
You can access the [facility] Patient Portal, offered by [facility], by registering with the following website: [email or web address]

## 2018-09-23 NOTE — DISCHARGE NOTE ADULT - CARE PLAN
Principal Discharge DX:	Asthma  Goal:	Maintain optimal respiratory status  Assessment and plan of treatment:	Complete course of prednisone  Continue singulair, advair- take every day even if you are feeling well, albuterol as needed  Secondary Diagnosis:	Benign prostatic hyperplasia, unspecified whether lower urinary tract symptoms present  Assessment and plan of treatment:	Continue flomax  Secondary Diagnosis:	Essential hypertension  Goal:	Maintain BP<130/80  Assessment and plan of treatment:	Continue Losartan, lasix, aldactone  Secondary Diagnosis:	Type 2 diabetes mellitus with diabetic cataract, unspecified whether long term insulin use  Goal:	Maintain glucose 100-180  Assessment and plan of treatment:	Continue insulin

## 2018-09-23 NOTE — PROGRESS NOTE ADULT - ASSESSMENT
74 yo Yakut speaking M who came from [facility] last week [place] live [place]. Pt with h/o obesity, DM, BPH, leg swelling, copd/ former 30 pack year smoker who reports  quitting  about 20 years ago,   Pt reports 2 days of productive cough, sob. RVP + for entero/ rhinovirus, cxr prelim negative. Denies fevers, rigors, hemoptysis. Denies recent sick contacts. Reports significant symptomatic improvement following duonebs and steroids.    RVP positive for RV/EV. Treated with ceftriaxone and azithromycin, duonebs, symbicort, steroids, lasix, and aldactone.

## 2018-09-23 NOTE — DISCHARGE NOTE ADULT - CARE PROVIDER_API CALL
Lisker, Gita N (MD), Medicine  Pulmonary Medicine  [address] [place] [address]  Phone: [phone]  Fax: [phone]    Pablo Matamoros  Phone: (   )    -  Fax: (   )    -

## 2018-09-23 NOTE — DISCHARGE NOTE ADULT - SECONDARY DIAGNOSIS.
Benign prostatic hyperplasia, unspecified whether lower urinary tract symptoms present Essential hypertension Type 2 diabetes mellitus with diabetic cataract, unspecified whether long term insulin use

## 2018-09-23 NOTE — DISCHARGE NOTE ADULT - HOSPITAL COURSE
72 yo Korean speaking M who came from [facility] last week [place] live [place]. Pt with h/o obesity, DM, BPH, leg swelling, copd/ former 30 pack year smoker who reports  quitting  about 20 years ago,   Pt reports 2 days of productive cough, sob. RVP + for entero/ rhinovirus, cxr prelim negative. Denies fevers, rigors, hemoptysis. Denies recent sick contacts. Reports significant symptomatic improvement following duonebs and steroids.

## 2018-09-23 NOTE — DISCHARGE NOTE ADULT - PLAN OF CARE
Maintain optimal respiratory status Complete course of prednisone  Continue singulair, advair- take every day even if you are feeling well, albuterol as needed Continue flomax Maintain BP<130/80 Continue Losartan, lasix, aldactone Maintain glucose 100-180 Continue insulin

## 2018-09-23 NOTE — PROGRESS NOTE ADULT - SUBJECTIVE AND OBJECTIVE BOX
CHIEF COMPLAINT:    Interval Events:    REVIEW OF SYSTEMS:  Constitutional:   Eyes:  ENT:  CV:  Resp:  GI:  :  MSK:  Integumentary:  Neurological:  Psychiatric:  Endocrine:  Hematologic/Lymphatic:  Allergic/Immunologic:  [ ] All other systems negative  [ ] Unable to assess ROS because ________    OBJECTIVE:  ICU Vital Signs Last 24 Hrs  T(C): 36.4 (23 Sep 2018 05:50), Max: 37.1 (22 Sep 2018 13:20)  T(F): 97.5 (23 Sep 2018 05:50), Max: 98.8 (22 Sep 2018 13:20)  HR: 93 (23 Sep 2018 05:50) (86 - 112)  BP: 149/84 (23 Sep 2018 05:50) (128/50 - 186/84)  BP(mean): --  ABP: --  ABP(mean): --  RR: 19 (23 Sep 2018 05:50) (19 - 25)  SpO2: 97% (23 Sep 2018 05:50) (95% - 100%)         @ 07:01  -   @ 07:00  --------------------------------------------------------  IN: 850 mL / OUT: 0 mL / NET: 850 mL      CAPILLARY BLOOD GLUCOSE      POCT Blood Glucose.: 333 mg/dL (23 Sep 2018 07:52)      PHYSICAL EXAM:  General:   HEENT:   Lymph Nodes:  Neck:   Respiratory:   Cardiovascular:   Abdomen:   Extremities:   Skin:   Neurological:  Psychiatry:    HOSPITAL MEDICATIONS:  MEDICATIONS  (STANDING):  ALBUTerol/ipratropium for Nebulization 3 milliLiter(s) Nebulizer every 6 hours  azithromycin  IVPB 500 milliGRAM(s) IV Intermittent every 24 hours  buDESOnide 160 MICROgram(s)/formoterol 4.5 MICROgram(s) Inhaler 2 Puff(s) Inhalation two times a day  cefTRIAXone   IVPB 1 Gram(s) IV Intermittent every 24 hours  dextrose 5%. 1000 milliLiter(s) (50 mL/Hr) IV Continuous <Continuous>  dextrose 50% Injectable 12.5 Gram(s) IV Push once  dextrose 50% Injectable 25 Gram(s) IV Push once  dextrose 50% Injectable 25 Gram(s) IV Push once  furosemide    Tablet 40 milliGRAM(s) Oral daily  influenza   Vaccine 0.5 milliLiter(s) IntraMuscular once  insulin glargine Injectable (LANTUS) 9 Unit(s) SubCutaneous at bedtime  insulin lispro (HumaLOG) corrective regimen sliding scale   SubCutaneous three times a day before meals  insulin lispro (HumaLOG) corrective regimen sliding scale   SubCutaneous at bedtime  losartan 100 milliGRAM(s) Oral daily  montelukast 10 milliGRAM(s) Oral daily  pantoprazole    Tablet 40 milliGRAM(s) Oral before breakfast  predniSONE   Tablet 40 milliGRAM(s) Oral daily  spironolactone 50 milliGRAM(s) Oral daily  tamsulosin 0.4 milliGRAM(s) Oral at bedtime    MEDICATIONS  (PRN):  dextrose 40% Gel 15 Gram(s) Oral once PRN Blood Glucose LESS THAN 70 milliGRAM(s)/deciliter  glucagon  Injectable 1 milliGRAM(s) IntraMuscular once PRN Glucose LESS THAN 70 milligrams/deciliter  loratadine Disintegrating Tablet 10 milliGRAM(s) Oral daily PRN pruritus      LABS:                        10.3   11.44 )-----------( 359      ( 23 Sep 2018 05:30 )             33.9         135  |  98  |  11  ----------------------------<  270<H>  4.5   |  22  |  0.99    Ca    9.1      22 Sep 2018 11:45    TPro  7.4  /  Alb  3.6  /  TBili  < 0.2<L>  /  DBili  x   /  AST  9   /  ALT  11  /  AlkPhos  94      PT/INR - ( 22 Sep 2018 11:45 )   PT: 9.9 SEC;   INR: 0.86          PTT - ( 22 Sep 2018 11:45 )  PTT:36.2 SEC  Urinalysis Basic - ( 22 Sep 2018 13:45 )    Color: COLORLESS / Appearance: CLEAR / S.008 / pH: 6.5  Gluc: 150 / Ketone: NEGATIVE  / Bili: NEGATIVE / Urobili: NORMAL   Blood: NEGATIVE / Protein: NEGATIVE / Nitrite: NEGATIVE   Leuk Esterase: NEGATIVE / RBC: x / WBC x   Sq Epi: x / Non Sq Epi: x / Bacteria: x        Venous Blood Gas:   @ 11:45  7.36/46/33/24/55.6  VBG Lactate: 2.1      MICROBIOLOGY:     RADIOLOGY:  [ ] Reviewed and interpreted by me    PULMONARY FUNCTION TESTS:    EKG: CHIEF COMPLAINT: Patient is a 73y old  Male who presents with a chief complaint of copd exacerbation (23 Sep 2018 13:36)    Interval Events: Admitted overnight    OBJECTIVE:  ICU Vital Signs Last 24 Hrs  T(C): 36.4 (23 Sep 2018 05:50), Max: 37.1 (22 Sep 2018 13:20)  T(F): 97.5 (23 Sep 2018 05:50), Max: 98.8 (22 Sep 2018 13:20)  HR: 93 (23 Sep 2018 05:50) (86 - 112)  BP: 149/84 (23 Sep 2018 05:50) (128/50 - 186/84)  BP(mean): --  ABP: --  ABP(mean): --  RR: 19 (23 Sep 2018 05:50) (19 - 25)  SpO2: 97% (23 Sep 2018 05:50) (95% - 100%)         @ 07:01  -   @ 07:00  --------------------------------------------------------  IN: 850 mL / OUT: 0 mL / NET: 850 mL      CAPILLARY BLOOD GLUCOSE      POCT Blood Glucose.: 333 mg/dL (23 Sep 2018 07:52)    HOSPITAL MEDICATIONS:  MEDICATIONS  (STANDING):  ALBUTerol/ipratropium for Nebulization 3 milliLiter(s) Nebulizer every 6 hours  azithromycin  IVPB 500 milliGRAM(s) IV Intermittent every 24 hours  buDESOnide 160 MICROgram(s)/formoterol 4.5 MICROgram(s) Inhaler 2 Puff(s) Inhalation two times a day  cefTRIAXone   IVPB 1 Gram(s) IV Intermittent every 24 hours  dextrose 5%. 1000 milliLiter(s) (50 mL/Hr) IV Continuous <Continuous>  dextrose 50% Injectable 12.5 Gram(s) IV Push once  dextrose 50% Injectable 25 Gram(s) IV Push once  dextrose 50% Injectable 25 Gram(s) IV Push once  furosemide    Tablet 40 milliGRAM(s) Oral daily  influenza   Vaccine 0.5 milliLiter(s) IntraMuscular once  insulin glargine Injectable (LANTUS) 9 Unit(s) SubCutaneous at bedtime  insulin lispro (HumaLOG) corrective regimen sliding scale   SubCutaneous three times a day before meals  insulin lispro (HumaLOG) corrective regimen sliding scale   SubCutaneous at bedtime  losartan 100 milliGRAM(s) Oral daily  montelukast 10 milliGRAM(s) Oral daily  pantoprazole    Tablet 40 milliGRAM(s) Oral before breakfast  predniSONE   Tablet 40 milliGRAM(s) Oral daily  spironolactone 50 milliGRAM(s) Oral daily  tamsulosin 0.4 milliGRAM(s) Oral at bedtime    MEDICATIONS  (PRN):  dextrose 40% Gel 15 Gram(s) Oral once PRN Blood Glucose LESS THAN 70 milliGRAM(s)/deciliter  glucagon  Injectable 1 milliGRAM(s) IntraMuscular once PRN Glucose LESS THAN 70 milligrams/deciliter  loratadine Disintegrating Tablet 10 milliGRAM(s) Oral daily PRN pruritus      LABS:                        10.3   11.44 )-----------( 359      ( 23 Sep 2018 05:30 )             33.9         135  |  98  |  11  ----------------------------<  270<H>  4.5   |  22  |  0.99    Ca    9.1      22 Sep 2018 11:45    TPro  7.4  /  Alb  3.6  /  TBili  < 0.2<L>  /  DBili  x   /  AST  9   /  ALT  11  /  AlkPhos  94      PT/INR - ( 22 Sep 2018 11:45 )   PT: 9.9 SEC;   INR: 0.86          PTT - ( 22 Sep 2018 11:45 )  PTT:36.2 SEC  Urinalysis Basic - ( 22 Sep 2018 13:45 )    Color: COLORLESS / Appearance: CLEAR / S.008 / pH: 6.5  Gluc: 150 / Ketone: NEGATIVE  / Bili: NEGATIVE / Urobili: NORMAL   Blood: NEGATIVE / Protein: NEGATIVE / Nitrite: NEGATIVE   Leuk Esterase: NEGATIVE / RBC: x / WBC x   Sq Epi: x / Non Sq Epi: x / Bacteria: x        Venous Blood Gas:   @ 11:45  7.36/46/33/24/55.6  VBG Lactate: 2.1      MICROBIOLOGY:     RADIOLOGY:  [ ] Reviewed and interpreted by me    PULMONARY FUNCTION TESTS:    EKG:

## 2018-09-27 LAB
BACTERIA BLD CULT: SIGNIFICANT CHANGE UP
BACTERIA BLD CULT: SIGNIFICANT CHANGE UP

## 2018-09-27 RX ORDER — SPIRONOLACTONE 25 MG/1
1 TABLET, FILM COATED ORAL
Qty: 0 | Refills: 0 | COMMUNITY

## 2018-09-27 RX ORDER — OLMESARTAN MEDOXOMIL 5 MG/1
1 TABLET, FILM COATED ORAL
Qty: 0 | Refills: 0 | COMMUNITY

## 2018-09-27 RX ORDER — MONTELUKAST 4 MG/1
1 TABLET, CHEWABLE ORAL
Qty: 0 | Refills: 0 | COMMUNITY

## 2018-09-27 RX ORDER — FLUTICASONE PROPIONATE AND SALMETEROL 50; 250 UG/1; UG/1
0 POWDER ORAL; RESPIRATORY (INHALATION)
Qty: 0 | Refills: 0 | COMMUNITY

## 2018-09-27 RX ORDER — FUROSEMIDE 40 MG
40 TABLET ORAL
Qty: 0 | Refills: 0 | COMMUNITY

## 2018-09-27 RX ORDER — TAMSULOSIN HYDROCHLORIDE 0.4 MG/1
1 CAPSULE ORAL
Qty: 0 | Refills: 0 | COMMUNITY

## 2018-09-29 ENCOUNTER — INPATIENT (INPATIENT)
Facility: HOSPITAL | Age: 73
LOS: 0 days | Discharge: HOME | End: 2018-09-30
Attending: HOSPITALIST | Admitting: HOSPITALIST
Payer: MEDICAID

## 2018-09-29 VITALS
HEART RATE: 95 BPM | TEMPERATURE: 97 F | RESPIRATION RATE: 16 BRPM | OXYGEN SATURATION: 98 % | DIASTOLIC BLOOD PRESSURE: 88 MMHG | SYSTOLIC BLOOD PRESSURE: 191 MMHG

## 2018-09-29 DIAGNOSIS — K82.9 DISEASE OF GALLBLADDER, UNSPECIFIED: Chronic | ICD-10-CM

## 2018-09-29 DIAGNOSIS — Z90.49 ACQUIRED ABSENCE OF OTHER SPECIFIED PARTS OF DIGESTIVE TRACT: Chronic | ICD-10-CM

## 2018-09-29 DIAGNOSIS — R07.9 CHEST PAIN, UNSPECIFIED: ICD-10-CM

## 2018-09-29 DIAGNOSIS — I10 ESSENTIAL (PRIMARY) HYPERTENSION: ICD-10-CM

## 2018-09-29 DIAGNOSIS — J44.9 CHRONIC OBSTRUCTIVE PULMONARY DISEASE, UNSPECIFIED: ICD-10-CM

## 2018-09-29 DIAGNOSIS — E11.8 TYPE 2 DIABETES MELLITUS WITH UNSPECIFIED COMPLICATIONS: ICD-10-CM

## 2018-09-29 PROBLEM — N40.0 BENIGN PROSTATIC HYPERPLASIA WITHOUT LOWER URINARY TRACT SYMPTOMS: Chronic | Status: ACTIVE | Noted: 2018-09-22

## 2018-09-29 LAB
ALBUMIN SERPL ELPH-MCNC: 3.6 G/DL — SIGNIFICANT CHANGE UP (ref 3.3–5)
ALP SERPL-CCNC: 89 U/L — SIGNIFICANT CHANGE UP (ref 40–120)
ALT FLD-CCNC: 10 U/L — SIGNIFICANT CHANGE UP (ref 4–41)
AMYLASE P1 CFR SERPL: 75 U/L — SIGNIFICANT CHANGE UP (ref 25–125)
APPEARANCE UR: CLEAR — SIGNIFICANT CHANGE UP
AST SERPL-CCNC: 8 U/L — SIGNIFICANT CHANGE UP (ref 4–40)
BACTERIA # UR AUTO: NEGATIVE — SIGNIFICANT CHANGE UP
BASOPHILS # BLD AUTO: 0.04 K/UL — SIGNIFICANT CHANGE UP (ref 0–0.2)
BASOPHILS # BLD AUTO: 0.05 K/UL — SIGNIFICANT CHANGE UP (ref 0–0.2)
BASOPHILS NFR BLD AUTO: 0.2 % — SIGNIFICANT CHANGE UP (ref 0–2)
BASOPHILS NFR BLD AUTO: 0.3 % — SIGNIFICANT CHANGE UP (ref 0–2)
BILIRUB SERPL-MCNC: 0.5 MG/DL — SIGNIFICANT CHANGE UP (ref 0.2–1.2)
BILIRUB UR-MCNC: NEGATIVE — SIGNIFICANT CHANGE UP
BLOOD UR QL VISUAL: NEGATIVE — SIGNIFICANT CHANGE UP
BUN SERPL-MCNC: 25 MG/DL — HIGH (ref 7–23)
CALCIUM SERPL-MCNC: 9.2 MG/DL — SIGNIFICANT CHANGE UP (ref 8.4–10.5)
CHLORIDE SERPL-SCNC: 97 MMOL/L — LOW (ref 98–107)
CO2 SERPL-SCNC: 20 MMOL/L — LOW (ref 22–31)
COLOR SPEC: YELLOW — SIGNIFICANT CHANGE UP
CREAT SERPL-MCNC: 1.07 MG/DL — SIGNIFICANT CHANGE UP (ref 0.5–1.3)
EOSINOPHIL # BLD AUTO: 0.14 K/UL — SIGNIFICANT CHANGE UP (ref 0–0.5)
EOSINOPHIL # BLD AUTO: 0.3 K/UL — SIGNIFICANT CHANGE UP (ref 0–0.5)
EOSINOPHIL NFR BLD AUTO: 0.9 % — SIGNIFICANT CHANGE UP (ref 0–6)
EOSINOPHIL NFR BLD AUTO: 1.5 % — SIGNIFICANT CHANGE UP (ref 0–6)
GLUCOSE SERPL-MCNC: 100 MG/DL — HIGH (ref 70–99)
GLUCOSE UR-MCNC: NEGATIVE — SIGNIFICANT CHANGE UP
HCT VFR BLD CALC: 34.7 % — LOW (ref 39–50)
HCT VFR BLD CALC: 36.6 % — LOW (ref 39–50)
HCT VFR BLD CALC: 36.6 % — LOW (ref 39–50)
HGB BLD-MCNC: 10.5 G/DL — LOW (ref 13–17)
HGB BLD-MCNC: 11.1 G/DL — LOW (ref 13–17)
HGB BLD-MCNC: 11.1 G/DL — LOW (ref 13–17)
HYALINE CASTS # UR AUTO: SIGNIFICANT CHANGE UP
IMM GRANULOCYTES # BLD AUTO: 0.11 # — SIGNIFICANT CHANGE UP
IMM GRANULOCYTES # BLD AUTO: 0.13 # — SIGNIFICANT CHANGE UP
IMM GRANULOCYTES NFR BLD AUTO: 0.6 % — SIGNIFICANT CHANGE UP (ref 0–1.5)
IMM GRANULOCYTES NFR BLD AUTO: 0.7 % — SIGNIFICANT CHANGE UP (ref 0–1.5)
KETONES UR-MCNC: SIGNIFICANT CHANGE UP
LACTATE SERPL-SCNC: 0.9 MMOL/L — SIGNIFICANT CHANGE UP (ref 0.5–2)
LEUKOCYTE ESTERASE UR-ACNC: NEGATIVE — SIGNIFICANT CHANGE UP
LIDOCAIN IGE QN: 28.9 U/L — SIGNIFICANT CHANGE UP (ref 7–60)
LYMPHOCYTES # BLD AUTO: 15.2 % — SIGNIFICANT CHANGE UP (ref 13–44)
LYMPHOCYTES # BLD AUTO: 16 % — SIGNIFICANT CHANGE UP (ref 13–44)
LYMPHOCYTES # BLD AUTO: 2.51 K/UL — SIGNIFICANT CHANGE UP (ref 1–3.3)
LYMPHOCYTES # BLD AUTO: 3.06 K/UL — SIGNIFICANT CHANGE UP (ref 1–3.3)
MCHC RBC-ENTMCNC: 22.1 PG — LOW (ref 27–34)
MCHC RBC-ENTMCNC: 22.1 PG — LOW (ref 27–34)
MCHC RBC-ENTMCNC: 22.2 PG — LOW (ref 27–34)
MCHC RBC-ENTMCNC: 30.3 % — LOW (ref 32–36)
MCV RBC AUTO: 72.8 FL — LOW (ref 80–100)
MCV RBC AUTO: 72.8 FL — LOW (ref 80–100)
MCV RBC AUTO: 73.2 FL — LOW (ref 80–100)
MONOCYTES # BLD AUTO: 1.22 K/UL — HIGH (ref 0–0.9)
MONOCYTES # BLD AUTO: 1.75 K/UL — HIGH (ref 0–0.9)
MONOCYTES NFR BLD AUTO: 7.8 % — SIGNIFICANT CHANGE UP (ref 2–14)
MONOCYTES NFR BLD AUTO: 8.7 % — SIGNIFICANT CHANGE UP (ref 2–14)
NEUTROPHILS # BLD AUTO: 11.71 K/UL — HIGH (ref 1.8–7.4)
NEUTROPHILS # BLD AUTO: 14.82 K/UL — HIGH (ref 1.8–7.4)
NEUTROPHILS NFR BLD AUTO: 73.8 % — SIGNIFICANT CHANGE UP (ref 43–77)
NEUTROPHILS NFR BLD AUTO: 74.3 % — SIGNIFICANT CHANGE UP (ref 43–77)
NITRITE UR-MCNC: NEGATIVE — SIGNIFICANT CHANGE UP
NRBC # FLD: 0 — SIGNIFICANT CHANGE UP
PH UR: 6.5 — SIGNIFICANT CHANGE UP (ref 5–8)
PLATELET # BLD AUTO: 315 K/UL — SIGNIFICANT CHANGE UP (ref 150–400)
PLATELET # BLD AUTO: 324 K/UL — SIGNIFICANT CHANGE UP (ref 150–400)
PLATELET # BLD AUTO: 324 K/UL — SIGNIFICANT CHANGE UP (ref 150–400)
PMV BLD: 10.1 FL — SIGNIFICANT CHANGE UP (ref 7–13)
POTASSIUM SERPL-MCNC: 4 MMOL/L — SIGNIFICANT CHANGE UP (ref 3.5–5.3)
POTASSIUM SERPL-SCNC: 4 MMOL/L — SIGNIFICANT CHANGE UP (ref 3.5–5.3)
PROT SERPL-MCNC: 7.2 G/DL — SIGNIFICANT CHANGE UP (ref 6–8.3)
PROT UR-MCNC: 30 — SIGNIFICANT CHANGE UP
RBC # BLD: 4.74 M/UL — SIGNIFICANT CHANGE UP (ref 4.2–5.8)
RBC # BLD: 5.03 M/UL — SIGNIFICANT CHANGE UP (ref 4.2–5.8)
RBC # BLD: 5.03 M/UL — SIGNIFICANT CHANGE UP (ref 4.2–5.8)
RBC # FLD: 15.1 % — HIGH (ref 10.3–14.5)
RBC # FLD: 15.4 % — HIGH (ref 10.3–14.5)
RBC # FLD: 15.4 % — HIGH (ref 10.3–14.5)
RBC CASTS # UR COMP ASSIST: SIGNIFICANT CHANGE UP (ref 0–?)
SODIUM SERPL-SCNC: 134 MMOL/L — LOW (ref 135–145)
SP GR SPEC: 1.03 — SIGNIFICANT CHANGE UP (ref 1–1.04)
SQUAMOUS # UR AUTO: SIGNIFICANT CHANGE UP
TROPONIN T, HIGH SENSITIVITY: 13 NG/L — SIGNIFICANT CHANGE UP (ref ?–14)
TROPONIN T, HIGH SENSITIVITY: 15 NG/L — SIGNIFICANT CHANGE UP (ref ?–14)
UROBILINOGEN FLD QL: NORMAL — SIGNIFICANT CHANGE UP
WBC # BLD: 15.73 K/UL — HIGH (ref 3.8–10.5)
WBC # BLD: 20.07 K/UL — HIGH (ref 3.8–10.5)
WBC # BLD: 20.07 K/UL — HIGH (ref 3.8–10.5)
WBC # FLD AUTO: 15.73 K/UL — HIGH (ref 3.8–10.5)
WBC # FLD AUTO: 20.07 K/UL — HIGH (ref 3.8–10.5)
WBC # FLD AUTO: 20.07 K/UL — HIGH (ref 3.8–10.5)
WBC UR QL: SIGNIFICANT CHANGE UP (ref 0–?)

## 2018-09-29 PROCEDURE — 71046 X-RAY EXAM CHEST 2 VIEWS: CPT | Mod: 26

## 2018-09-29 RX ORDER — PANTOPRAZOLE SODIUM 20 MG/1
40 TABLET, DELAYED RELEASE ORAL
Qty: 0 | Refills: 0 | Status: DISCONTINUED | OUTPATIENT
Start: 2018-09-29 | End: 2018-09-30

## 2018-09-29 RX ORDER — MONTELUKAST 4 MG/1
10 TABLET, CHEWABLE ORAL DAILY
Qty: 0 | Refills: 0 | Status: DISCONTINUED | OUTPATIENT
Start: 2018-09-29 | End: 2018-09-30

## 2018-09-29 RX ORDER — FAMOTIDINE 10 MG/ML
20 INJECTION INTRAVENOUS ONCE
Qty: 0 | Refills: 0 | Status: COMPLETED | OUTPATIENT
Start: 2018-09-29 | End: 2018-09-29

## 2018-09-29 RX ORDER — ALBUTEROL 90 UG/1
2.5 AEROSOL, METERED ORAL ONCE
Qty: 0 | Refills: 0 | Status: COMPLETED | OUTPATIENT
Start: 2018-09-29 | End: 2018-09-29

## 2018-09-29 RX ORDER — GLUCAGON INJECTION, SOLUTION 0.5 MG/.1ML
1 INJECTION, SOLUTION SUBCUTANEOUS ONCE
Qty: 0 | Refills: 0 | Status: DISCONTINUED | OUTPATIENT
Start: 2018-09-29 | End: 2018-09-30

## 2018-09-29 RX ORDER — INSULIN GLARGINE 100 [IU]/ML
18 INJECTION, SOLUTION SUBCUTANEOUS AT BEDTIME
Qty: 0 | Refills: 0 | Status: DISCONTINUED | OUTPATIENT
Start: 2018-09-29 | End: 2018-09-30

## 2018-09-29 RX ORDER — LOSARTAN POTASSIUM 100 MG/1
100 TABLET, FILM COATED ORAL DAILY
Qty: 0 | Refills: 0 | Status: DISCONTINUED | OUTPATIENT
Start: 2018-09-29 | End: 2018-09-30

## 2018-09-29 RX ORDER — METOCLOPRAMIDE HCL 10 MG
10 TABLET ORAL ONCE
Qty: 0 | Refills: 0 | Status: COMPLETED | OUTPATIENT
Start: 2018-09-29 | End: 2018-09-29

## 2018-09-29 RX ORDER — TAMSULOSIN HYDROCHLORIDE 0.4 MG/1
0.4 CAPSULE ORAL AT BEDTIME
Qty: 0 | Refills: 0 | Status: DISCONTINUED | OUTPATIENT
Start: 2018-09-29 | End: 2018-09-30

## 2018-09-29 RX ORDER — DEXTROSE 50 % IN WATER 50 %
15 SYRINGE (ML) INTRAVENOUS ONCE
Qty: 0 | Refills: 0 | Status: DISCONTINUED | OUTPATIENT
Start: 2018-09-29 | End: 2018-09-30

## 2018-09-29 RX ORDER — INSULIN LISPRO 100/ML
VIAL (ML) SUBCUTANEOUS
Qty: 0 | Refills: 0 | Status: DISCONTINUED | OUTPATIENT
Start: 2018-09-29 | End: 2018-09-30

## 2018-09-29 RX ORDER — DEXTROSE 50 % IN WATER 50 %
25 SYRINGE (ML) INTRAVENOUS ONCE
Qty: 0 | Refills: 0 | Status: DISCONTINUED | OUTPATIENT
Start: 2018-09-29 | End: 2018-09-30

## 2018-09-29 RX ORDER — INSULIN LISPRO 100/ML
VIAL (ML) SUBCUTANEOUS AT BEDTIME
Qty: 0 | Refills: 0 | Status: DISCONTINUED | OUTPATIENT
Start: 2018-09-29 | End: 2018-09-30

## 2018-09-29 RX ORDER — ACETAMINOPHEN 500 MG
650 TABLET ORAL ONCE
Qty: 0 | Refills: 0 | Status: COMPLETED | OUTPATIENT
Start: 2018-09-29 | End: 2018-09-29

## 2018-09-29 RX ORDER — ONDANSETRON 8 MG/1
4 TABLET, FILM COATED ORAL ONCE
Qty: 0 | Refills: 0 | Status: DISCONTINUED | OUTPATIENT
Start: 2018-09-29 | End: 2018-09-29

## 2018-09-29 RX ORDER — BUDESONIDE AND FORMOTEROL FUMARATE DIHYDRATE 160; 4.5 UG/1; UG/1
2 AEROSOL RESPIRATORY (INHALATION)
Qty: 0 | Refills: 0 | Status: DISCONTINUED | OUTPATIENT
Start: 2018-09-29 | End: 2018-09-30

## 2018-09-29 RX ORDER — DEXTROSE 50 % IN WATER 50 %
12.5 SYRINGE (ML) INTRAVENOUS ONCE
Qty: 0 | Refills: 0 | Status: DISCONTINUED | OUTPATIENT
Start: 2018-09-29 | End: 2018-09-30

## 2018-09-29 RX ORDER — SODIUM CHLORIDE 9 MG/ML
1000 INJECTION, SOLUTION INTRAVENOUS
Qty: 0 | Refills: 0 | Status: DISCONTINUED | OUTPATIENT
Start: 2018-09-29 | End: 2018-09-30

## 2018-09-29 RX ADMIN — Medication 10 MILLIGRAM(S): at 17:35

## 2018-09-29 RX ADMIN — Medication 100 MILLIGRAM(S): at 23:33

## 2018-09-29 RX ADMIN — FAMOTIDINE 20 MILLIGRAM(S): 10 INJECTION INTRAVENOUS at 11:35

## 2018-09-29 RX ADMIN — INSULIN GLARGINE 18 UNIT(S): 100 INJECTION, SOLUTION SUBCUTANEOUS at 22:37

## 2018-09-29 RX ADMIN — BUDESONIDE AND FORMOTEROL FUMARATE DIHYDRATE 2 PUFF(S): 160; 4.5 AEROSOL RESPIRATORY (INHALATION) at 22:37

## 2018-09-29 RX ADMIN — ALBUTEROL 2.5 MILLIGRAM(S): 90 AEROSOL, METERED ORAL at 11:35

## 2018-09-29 RX ADMIN — Medication 650 MILLIGRAM(S): at 11:46

## 2018-09-29 RX ADMIN — Medication 30 MILLILITER(S): at 11:35

## 2018-09-29 RX ADMIN — Medication 650 MILLIGRAM(S): at 11:35

## 2018-09-29 RX ADMIN — TAMSULOSIN HYDROCHLORIDE 0.4 MILLIGRAM(S): 0.4 CAPSULE ORAL at 22:37

## 2018-09-29 NOTE — H&P ADULT - PROBLEM SELECTOR PLAN 4
Last A1C was 1Week ago 9.8, at present Pt serum glucose in low 100s will resume Current dose of lantus Last A1C was 1 Week ago 9.8, at present Pt serum glucose in low 100s will resume Current dose of lantus as pt had food from outside in the ED and was able to tolerate

## 2018-09-29 NOTE — H&P ADULT - NEGATIVE GENERAL SYMPTOMS
no weight loss/no polyphagia/no fatigue/no chills/no malaise/no polydipsia/no weight gain/no polyuria/no fever/no sweating

## 2018-09-29 NOTE — ED PROVIDER NOTE - OBJECTIVE STATEMENT
73 M, Nepali- speaking hx of HTN, DMT2, BPH, COPD, smoker(quit 20 yrs ago) presents due to chest pressure. Pt with 1 episode of emesis yesterday  this morning developed 3 episodes NBNB clear emesis. Also reports feeling associated chest pressure, lasting apprx 5 mins, non radiating. Also with h/a, dizziness, stomach pain, and gassy. Reports taking zantac in this morning. Denies diaphoresis, fever, chills, diarrhea.

## 2018-09-29 NOTE — H&P ADULT - PROBLEM SELECTOR PLAN 3
Will resume home ARBS, but will hold lasix and Spironolactone for now ( in light of BUN SCr ratio and Hyponatremia, Will ECHO in am to asses LVF, Will monitor BP If still elevated will consider Atenolol for BP - Will resume home ARBS, but will hold lasix and Spironolactone for now (in light of BUN SCr ratio and Hyponatremia)  - Will ECHO in am to assess LVF  - Will monitor BP If still elevated will consider Atenolol for BP

## 2018-09-29 NOTE — H&P ADULT - HISTORY OF PRESENT ILLNESS
Pt 72 yo male with Hx of HTN DM COPD (According to prior records, Ex Smoker) and Hx of BPH presenting for evaluation of nausea and vomiting. lat nigh,  while in a car on a way home from [place], he noted single episode of nausea with NB/NB vomiting with associated dizziness and diaphoresis. Episode was self limited, with no associated C/P SOB or palpitations. While at home Pt had dinner (but only limiting amount/reports decreased appetite since Friday) and went to bed. This morning Pt wake up around [id number] with persistent nausea and three episodes of NB/NB vomiting. Pt also endorses intermittent, generalized, cramping like abdominal discomfort. Discomfort is non radiating with no aggravating or alleviating factors, not associated with food intake or position. Pt reports no fever or chills, no diarrhea, no back pain and no dysuria. Pt reports no changes in his exertional tolerance denies any chest pain, leg pain or swelling. Pt reports no orthopnea, no Wt changes. Pt reports chronic cough for past three years with white to yellow sputum, and same duration exertional dyspnea that is unchanged.   Pt was seen in [facility] a week ago for cough and dyspnea was had positive RVP for entero-rhinovirus and was sent home on following day with NEBS and Prednisone that was completed last Wednesday Pt 72 yo male with Hx of HTN DM COPD (According to prior records, Ex Smoker) and Hx of BPH presenting for evaluation of nausea and vomiting. lat nigh,  while in a car on a way home from [place], he noted single episode of nausea with NB/NB vomiting with associated dizziness and diaphoresis. Episode was self limited, with no associated C/P SOB or palpitations. While at home Pt had dinner (but only limiting amount/reports decreased appetite since Friday) and went to bed. This morning Pt wake up around [id number] with persistent nausea and three episodes of NB/NB vomiting. Pt also endorses intermittent, generalized, cramping like abdominal discomfort. Discomfort is non radiating with no aggravating or alleviating factors, not associated with food intake or position. Pt reports no fever or chills, no diarrhea, no back pain and no dysuria. Pt reports no changes in his exertional tolerance denies any chest pain, leg pain or swelling. Pt reports no orthopnea, no Wt changes. Pt reports chronic cough for past three years with white to yellow sputum, and same duration exertional dyspnea that is unchanged.   Pt was seen in [facility] a week ago for cough and dyspnea was had positive RVP for entero-rhinovirus and was sent home on following day with NEBS and Prednisone that was completed last Wednesday   Pt reports Hx of STRESS and ECHO that were done in [facility] one year ago and were normal. Pt also endorse EGD one year ago as well and according to PT it was normal  Pt came from [facility] to [place] 3m ago, does not have PMD Pt 72 yo male with Hx of HTN DM COPD (According to prior records, Ex Smoker) and Hx of BPH presenting for evaluation of nausea and vomiting. last night, while in a car on a way home from [place], he noted single episode of nausea with NB/NB vomiting with associated dizziness and diaphoresis. Episode was self limited, with no associated C/P SOB or palpitations. While at home Pt had dinner (but only limiting amount/reports decreased appetite since Friday) and went to bed. This morning Pt wake up around [id number] with persistent nausea and three episodes of NB/NB vomiting. Pt also endorses intermittent, generalized, cramping like abdominal discomfort. Discomfort is non radiating with no aggravating or alleviating factors, not associated with food intake or position. Pt reports no fever or chills, no diarrhea, no back pain and no dysuria. Pt reports no changes in his exertional tolerance denies any chest pain, leg pain or swelling. Pt reports no orthopnea, no Wt changes. Pt reports chronic cough for past three years with white to yellow sputum, and same duration exertional dyspnea that is unchanged.   Pt was seen in [facility] a week ago for cough and dyspnea was had positive RVP for entero-rhinovirus and was sent home on following day with NEBS and Prednisone that was completed last Wednesday   Pt reports Hx of STRESS and ECHO that were done in [facility] one year ago and were normal. Pt also endorse EGD one year ago as well and according to PT it was normal  Pt came from [facility] to [place] 3m ago, does not have PMD

## 2018-09-29 NOTE — H&P ADULT - PROBLEM SELECTOR PLAN 2
Will resume Advair and Singulair, NEBS as needed, But form Pulmonary stand pion no further interventions are needed No S/S of exacerbation.   - Will resume Advair and Singulair, NEBS as needed

## 2018-09-29 NOTE — H&P ADULT - MUSCULOSKELETAL
details… detailed exam ROM intact/no joint warmth/no joint erythema/normal strength/no joint swelling

## 2018-09-29 NOTE — H&P ADULT - NSHPLABSRESULTS_GEN_ALL_CORE
ECG  NSR 72  ECG  NSR 72     Imaging personally reviewed.   Xray Chest 2 Views PA/Lat (09.29.18 @ 12:07)     IMPRESSION:  Clear lungs. No pleural effusions or pneumothorax.    Cardiac and mediastinal silhouettes within normal limits. Stable cardiac   and mediastinal silhouettes.     Trachea midline.    Generalized osteopenia and spinal degenerative changes.

## 2018-09-29 NOTE — ED ADULT NURSE NOTE - NSIMPLEMENTINTERV_GEN_ALL_ED
Implemented All Universal Safety Interventions:  [place] to call system. Call bell, personal items and telephone within reach. Instruct patient to call for assistance. Room bathroom lighting operational. Non-slip footwear when patient is off stretcher. Physically safe environment: no spills, clutter or unnecessary equipment. Stretcher in lowest position, wheels locked, appropriate side rails in place.

## 2018-09-29 NOTE — H&P ADULT - NEUROLOGICAL DETAILS
alert and oriented x 3/sensation intact/responds to verbal commands/cranial nerves intact/responds to pain/normal strength

## 2018-09-29 NOTE — H&P ADULT - PROBLEM SELECTOR PLAN 1
Will TELE CE ECG LYTES will monitor On TELE for now, Pt had negative W/U In [place] 1y ago, now with N/V abdominal discomfort, but no exertional complains and nonischemic CE and ECG, more incline to belive that symptoms are GI related, will keep Pt on PPI  now, But will in Light of Risk factors will terry ECHO and STRESS tomorrow.   Will Hold Diuretics for now, (unclear Why Pt is on Diuretics) and will monitor SCr  Elevated WBC likely steroids with hemoconcentration, Pt is afebrile with No obvious source . Will observe for now    Pt was noted with AG of 17 mild Ketosis, ?? starvation will F/U Lactate for now and RPT BHx Will TELE CE ECG LYTES will monitor On TELE for now, Pt had negative W/U In [place] 1y ago, now with N/V abdominal discomfort, but no exertional complains and nonischemic CE and ECG, more incline to believe that symptoms are GI related, will keep Pt on PPI  now, Will F/U Amylase and Lipase Lactate and CBC stool for occult blood ordered , will reassess   But will in Light of Risk factors will terry ECHO and STRESS tomorrow.   Will Hold Diuretics for now, (unclear Why Pt is on Diuretics) and will monitor SCr  Elevated WBC likely steroids with hemoconcentration, Pt is afebrile with No obvious source . Will observe for now    Pt was noted with AG of 17 mild Ketosis, ?? starvation will F/U Lactate for now and RPT BHx Will TELE CE ECG LYTES will monitor On TELE for now, Pt had negative W/U In [place] 1y ago, now with N/V abdominal discomfort, but no exertional complains and nonischemic CE and ECG, more incline to believe that symptoms are GI related, will keep Pt on PPI  now, Will F/U Amylase and Lipase Lactate and CBC stool for occult blood ordered , Will observe reintroduce PO intake and if Symptoms continue to persist will call GI HS in am    But will in Light of Risk factors ( and no PMD )  will do ECHO and STRESS tomorrow.   Will Hold Diuretics for now, (unclear Why Pt is on Diuretics) and will monitor SCr  Elevated WBC likely steroids with hemoconcentration, Pt is afebrile with No obvious source . Will observe for now    Pt was noted with AG of 17 mild Ketosis, ?? starvation will F/U Lactate for now and RPT BHx On TELE for now, Pt had negative W/U In [place] 1y ago, now with N/V abdominal discomfort, but no exertional complaints and nonischemic CE and ECG, more inclined to believe that symptoms are GI related, possibly from recent steroid use for viral infection.  - Will c/w TELE   - Serial EKG and Yumiko if pt has recurrence of chest pain  - Monitor electrolytes  - Will keep pt on PPI for now. Will F/U Amylase and Lipase, Lactate, CBC, stool for occult blood   - Will reintroduce PO intake and if GI symptoms continue to persist will call GI HS in am. Will also consider CTH to r/o vertebrobasilar insufficiency causing dizziness, nausea, and vomiting if these symptoms persist.  - Given risk factors ( and no PMD ), will do ECHO and possible STRESS tomorrow.   - Will Hold Diuretics for now, (unclear why Pt is on Diuretics) and will monitor SCr  - Elevated WBC likely steroids with hemoconcentration, Pt is afebrile with No obvious source . Will observe for now.  - Pt was noted with AG of 17 with trace ketones in urine, will F/U Lactate for now and repeat BMP

## 2018-09-29 NOTE — H&P ADULT - ASSESSMENT
Pt 74yo male with Hx as above admitted to [facility] for C/P. While in triage Pt was noted with BP of 191/88 with HR 95 RR 16 98% on RA. Pt also C/O nausea and abdominal discomfort. Pt received Dose of Pepcid as well as raglan IV with Improvement of Symptoms. Pt BP and HR improved itself to 150/64 HR 73 RR 17 99% on RA and afebrile. On exam Pt was noted with unilateral crackles, and mild abdominal tendrness but no rebound or guarding rest of exam was benign. CE x 2 and ECG are nonischemic and CXR was unrevealing. Bed side SONO revealed Preserved LVF and RVF with collapsed IVC, and dry lungs   Pt was noted with WBC 20 also with BUN Cr 25/1 with Serum Bicarb of 20 AG 17 and trace of ketones on UA (UA normal) Serum Glucose 100, Na 134 Cl 97 Pt 72yo male with Hx as above admitted to [facility] for C/P. While in triage Pt was noted with BP of 191/88 with HR 95 RR 16 98% on RA. Pt also C/O nausea and abdominal discomfort. Pt received Dose of Pepcid as well as raglan IV with Improvement of Symptoms. Pt BP and HR improved itself to 150/64 HR 73 RR 17 99% on RA and afebrile. On exam Pt was noted with unilateral crackles, and mild abdominal tendrness but no rebound or guarding rest of exam was benign with no neurological deficit . CE x 2 and ECG are nonischemic and CXR was unrevealing. Bed side SONO revealed Preserved LVF and RVF with collapsed IVC, and dry lungs   Pt was noted with WBC 20 also with BUN Cr 25/1 with Serum Bicarb of 20 AG 17 and trace of ketones on UA (UA normal) Serum Glucose 100, Na 134 Cl 97

## 2018-09-29 NOTE — PATIENT PROFILE ADULT. - NS PRO MODE OF ARRIVAL
Resting quietly with eyes closed. Easily awakened. No acute distress noted. Respirations even and non-labored.      Patti Evans RN  01/08/17 [id number]     stretcher

## 2018-09-29 NOTE — H&P ADULT - NEGATIVE GENERAL GENITOURINARY SYMPTOMS
no bladder infections/no hematuria/no renal colic/no flank pain L/no flank pain R/no incontinence/no gas in urine/no dysuria/no urine discoloration

## 2018-09-29 NOTE — H&P ADULT - NEGATIVE CARDIOVASCULAR SYMPTOMS
no orthopnea/no claudication/no paroxysmal nocturnal dyspnea/no palpitations/no peripheral edema/no chest pain

## 2018-09-29 NOTE — ED PROVIDER NOTE - MEDICAL DECISION MAKING DETAILS
73 M hx of DMT2, BPH, COPD, smoker(quit 20 yrs ago) presents due to emesis, chest pressure. Concerning for ACS vs GERD vs PUD. Plan for CBC, CMP, Hope att: 73 M hx of DMT2, BPH, COPD, smoker(quit 20 yrs ago) presents due to emesis, chest pressure. Concerning for ACS vs GERD vs PUD. Plan for CBC, CMP, Hope att: 73 M hx of DMT2, BPH, COPD, smoker(quit 20 yrs ago) presents due to emesis, chest pressure. Concerning for ACS vs GERD vs PUD. Plan for CBC, CMP, cardiac enzymes, chest xray, ecg and tele for monitoring.

## 2018-09-30 VITALS
HEART RATE: 69 BPM | TEMPERATURE: 97 F | SYSTOLIC BLOOD PRESSURE: 128 MMHG | RESPIRATION RATE: 17 BRPM | OXYGEN SATURATION: 99 % | DIASTOLIC BLOOD PRESSURE: 72 MMHG

## 2018-09-30 DIAGNOSIS — D50.9 IRON DEFICIENCY ANEMIA, UNSPECIFIED: ICD-10-CM

## 2018-09-30 DIAGNOSIS — R11.2 NAUSEA WITH VOMITING, UNSPECIFIED: ICD-10-CM

## 2018-09-30 DIAGNOSIS — R05 COUGH: ICD-10-CM

## 2018-09-30 LAB
ALBUMIN SERPL ELPH-MCNC: 4 G/DL — SIGNIFICANT CHANGE UP (ref 3.3–5)
ALP SERPL-CCNC: 94 U/L — SIGNIFICANT CHANGE UP (ref 40–120)
ALT FLD-CCNC: 12 U/L — SIGNIFICANT CHANGE UP (ref 4–41)
AST SERPL-CCNC: 18 U/L — SIGNIFICANT CHANGE UP (ref 4–40)
BASOPHILS # BLD AUTO: 0.03 K/UL — SIGNIFICANT CHANGE UP (ref 0–0.2)
BASOPHILS NFR BLD AUTO: 0.2 % — SIGNIFICANT CHANGE UP (ref 0–2)
BILIRUB SERPL-MCNC: 0.6 MG/DL — SIGNIFICANT CHANGE UP (ref 0.2–1.2)
BUN SERPL-MCNC: 19 MG/DL — SIGNIFICANT CHANGE UP (ref 7–23)
CALCIUM SERPL-MCNC: 9.2 MG/DL — SIGNIFICANT CHANGE UP (ref 8.4–10.5)
CHLORIDE SERPL-SCNC: 95 MMOL/L — LOW (ref 98–107)
CO2 SERPL-SCNC: 27 MMOL/L — SIGNIFICANT CHANGE UP (ref 22–31)
CREAT SERPL-MCNC: 1.08 MG/DL — SIGNIFICANT CHANGE UP (ref 0.5–1.3)
EOSINOPHIL # BLD AUTO: 0.21 K/UL — SIGNIFICANT CHANGE UP (ref 0–0.5)
EOSINOPHIL NFR BLD AUTO: 1.5 % — SIGNIFICANT CHANGE UP (ref 0–6)
GLUCOSE SERPL-MCNC: 161 MG/DL — HIGH (ref 70–99)
HCT VFR BLD CALC: 37.6 % — LOW (ref 39–50)
HGB BLD-MCNC: 11.3 G/DL — LOW (ref 13–17)
IMM GRANULOCYTES # BLD AUTO: 0.08 # — SIGNIFICANT CHANGE UP
IMM GRANULOCYTES NFR BLD AUTO: 0.6 % — SIGNIFICANT CHANGE UP (ref 0–1.5)
LYMPHOCYTES # BLD AUTO: 18 % — SIGNIFICANT CHANGE UP (ref 13–44)
LYMPHOCYTES # BLD AUTO: 2.52 K/UL — SIGNIFICANT CHANGE UP (ref 1–3.3)
MAGNESIUM SERPL-MCNC: 2.5 MG/DL — SIGNIFICANT CHANGE UP (ref 1.6–2.6)
MCHC RBC-ENTMCNC: 22.2 PG — LOW (ref 27–34)
MCHC RBC-ENTMCNC: 30.1 % — LOW (ref 32–36)
MCV RBC AUTO: 73.9 FL — LOW (ref 80–100)
MONOCYTES # BLD AUTO: 1.31 K/UL — HIGH (ref 0–0.9)
MONOCYTES NFR BLD AUTO: 9.4 % — SIGNIFICANT CHANGE UP (ref 2–14)
NEUTROPHILS # BLD AUTO: 9.85 K/UL — HIGH (ref 1.8–7.4)
NEUTROPHILS NFR BLD AUTO: 70.3 % — SIGNIFICANT CHANGE UP (ref 43–77)
NRBC # FLD: 0 — SIGNIFICANT CHANGE UP
PHOSPHATE SERPL-MCNC: 2.7 MG/DL — SIGNIFICANT CHANGE UP (ref 2.5–4.5)
PLATELET # BLD AUTO: 331 K/UL — SIGNIFICANT CHANGE UP (ref 150–400)
PMV BLD: 10.4 FL — SIGNIFICANT CHANGE UP (ref 7–13)
POTASSIUM SERPL-MCNC: 3.9 MMOL/L — SIGNIFICANT CHANGE UP (ref 3.5–5.3)
POTASSIUM SERPL-SCNC: 3.9 MMOL/L — SIGNIFICANT CHANGE UP (ref 3.5–5.3)
PROT SERPL-MCNC: 7.3 G/DL — SIGNIFICANT CHANGE UP (ref 6–8.3)
RBC # BLD: 5.09 M/UL — SIGNIFICANT CHANGE UP (ref 4.2–5.8)
RBC # FLD: 15.3 % — HIGH (ref 10.3–14.5)
SODIUM SERPL-SCNC: 133 MMOL/L — LOW (ref 135–145)
WBC # BLD: 14 K/UL — HIGH (ref 3.8–10.5)
WBC # FLD AUTO: 14 K/UL — HIGH (ref 3.8–10.5)

## 2018-09-30 PROCEDURE — 93018 CV STRESS TEST I&R ONLY: CPT | Mod: GC

## 2018-09-30 PROCEDURE — 78452 HT MUSCLE IMAGE SPECT MULT: CPT | Mod: 26

## 2018-09-30 PROCEDURE — 93016 CV STRESS TEST SUPVJ ONLY: CPT | Mod: GC

## 2018-09-30 PROCEDURE — 99239 HOSP IP/OBS DSCHRG MGMT >30: CPT

## 2018-09-30 RX ORDER — POLYETHYLENE GLYCOL 3350 17 G/17G
1 POWDER, FOR SOLUTION ORAL
Qty: 119 | Refills: 0 | OUTPATIENT
Start: 2018-09-30 | End: 2018-10-06

## 2018-09-30 RX ORDER — FLUTICASONE PROPIONATE 50 MCG
1 SPRAY, SUSPENSION NASAL
Qty: 1 | Refills: 0 | OUTPATIENT
Start: 2018-09-30 | End: 2018-10-29

## 2018-09-30 RX ORDER — OMEPRAZOLE 10 MG/1
1 CAPSULE, DELAYED RELEASE ORAL
Qty: 30 | Refills: 0 | OUTPATIENT
Start: 2018-09-30 | End: 2018-10-29

## 2018-09-30 RX ORDER — ESOMEPRAZOLE MAGNESIUM 40 MG/1
20 CAPSULE, DELAYED RELEASE ORAL
Qty: 0 | Refills: 0 | COMMUNITY

## 2018-09-30 RX ADMIN — MONTELUKAST 10 MILLIGRAM(S): 4 TABLET, CHEWABLE ORAL at 10:20

## 2018-09-30 RX ADMIN — Medication 2: at 13:00

## 2018-09-30 RX ADMIN — PANTOPRAZOLE SODIUM 40 MILLIGRAM(S): 20 TABLET, DELAYED RELEASE ORAL at 06:08

## 2018-09-30 RX ADMIN — BUDESONIDE AND FORMOTEROL FUMARATE DIHYDRATE 2 PUFF(S): 160; 4.5 AEROSOL RESPIRATORY (INHALATION) at 10:21

## 2018-09-30 RX ADMIN — LOSARTAN POTASSIUM 100 MILLIGRAM(S): 100 TABLET, FILM COATED ORAL at 06:08

## 2018-09-30 RX ADMIN — Medication 1: at 10:20

## 2018-09-30 RX ADMIN — Medication 100 MILLIGRAM(S): at 06:08

## 2018-09-30 NOTE — PROGRESS NOTE ADULT - PROBLEM SELECTOR PLAN 3
- Will resume home ARBS, but will hold lasix and Spironolactone for now (in light of BUN SCr ratio and Hyponatremia)  - Will ECHO in am to assess LVF  - Will monitor BP If still elevated will consider Atenolol for BP Maybe 2/2 to lingering viral infection, but may also be due to underlying reflux +/- post-nasal drip  - treat gastritis/dyspepsia with PPI as described above  - start trial of fluticasone nasal spray 2 sprays per nostril once daily  - Tessalon perle 100mg TID PRN

## 2018-09-30 NOTE — DISCHARGE NOTE ADULT - ADDITIONAL INSTRUCTIONS
follow up with your PMD in one week - call for appointment  You will need to follow up with PMD for further work up once you establishes medical care. Obtain insurance.  You will need to obtain a blood pressure machine and monitor your blood pressure. If your blood pressure is elevated you will need to see a medical doctor right away for medication.

## 2018-09-30 NOTE — PROGRESS NOTE ADULT - SUBJECTIVE AND OBJECTIVE BOX
Patient is a 73y old  Male who presents with a chief complaint of CP (29 Sep 2018 21:14)    SUBJECTIVE / OVERNIGHT EVENTS:      MEDICATIONS  (STANDING):  buDESOnide 160 MICROgram(s)/formoterol 4.5 MICROgram(s) Inhaler 2 Puff(s) Inhalation two times a day  dextrose 5%. 1000 milliLiter(s) (50 mL/Hr) IV Continuous <Continuous>  dextrose 50% Injectable 12.5 Gram(s) IV Push once  dextrose 50% Injectable 25 Gram(s) IV Push once  dextrose 50% Injectable 25 Gram(s) IV Push once  insulin glargine Injectable (LANTUS) 18 Unit(s) SubCutaneous at bedtime  insulin lispro (HumaLOG) corrective regimen sliding scale   SubCutaneous three times a day before meals  insulin lispro (HumaLOG) corrective regimen sliding scale   SubCutaneous at bedtime  losartan 100 milliGRAM(s) Oral daily  montelukast 10 milliGRAM(s) Oral daily  pantoprazole    Tablet 40 milliGRAM(s) Oral before breakfast  tamsulosin 0.4 milliGRAM(s) Oral at bedtime    MEDICATIONS  (PRN):  aluminum hydroxide/magnesium hydroxide/simethicone Suspension 30 milliLiter(s) Oral every 4 hours PRN Dyspepsia  benzonatate 100 milliGRAM(s) Oral three times a day PRN Cough  dextrose 40% Gel 15 Gram(s) Oral once PRN Blood Glucose LESS THAN 70 milliGRAM(s)/deciliter  glucagon  Injectable 1 milliGRAM(s) IntraMuscular once PRN Glucose LESS THAN 70 milligrams/deciliter      Vital Signs Last 24 Hrs  T(C): 36.7 (30 Sep 2018 05:45), Max: 36.7 (30 Sep 2018 05:45)  T(F): 98 (30 Sep 2018 05:45), Max: 98 (30 Sep 2018 05:45)  HR: 77 (30 Sep 2018 05:45) (71 - 78)  BP: 136/63 (30 Sep 2018 05:45) (136/63 - 150/65)  BP(mean): --  RR: 16 (30 Sep 2018 05:45) (16 - 18)  SpO2: 99% (30 Sep 2018 05:45) (98% - 100%)  CAPILLARY BLOOD GLUCOSE      POCT Blood Glucose.: 178 mg/dL (30 Sep 2018 10:17)  POCT Blood Glucose.: 168 mg/dL (29 Sep 2018 22:08)  POCT Blood Glucose.: 121 mg/dL (29 Sep 2018 18:07)    I&O's Summary    29 Sep 2018 07:  -  30 Sep 2018 07:00  --------------------------------------------------------  IN: 400 mL / OUT: 0 mL / NET: 400 mL    30 Sep 2018 07:  -  30 Sep 2018 11:51  --------------------------------------------------------  IN: 120 mL / OUT: 0 mL / NET: 120 mL          PHYSICAL EXAM  GENERAL: NAD, well-developed  HEAD:  Atraumatic, Normocephalic  EYES: EOMI, PERRLA, conjunctiva and sclera clear  NECK: Supple, No JVD  CHEST/LUNG: Clear to auscultation bilaterally; No wheeze  HEART: Regular rate and rhythm; No murmurs, rubs, or gallops  ABDOMEN: Soft, Nontender, Nondistended; Bowel sounds present  EXTREMITIES:  2+ Peripheral Pulses, No clubbing, cyanosis, or edema  PSYCH: AAOx3  SKIN: No rashes or lesions    LABS:                        11.3   14.00 )-----------( 331      ( 30 Sep 2018 05:52 )             37.6         133<L>  |  95<L>  |  19  ----------------------------<  161<H>  3.9   |  27  |  1.08    Ca    9.2      30 Sep 2018 05:52  Phos  2.7       Mg     2.5         TPro  7.3  /  Alb  4.0  /  TBili  0.6  /  DBili  x   /  AST  18  /  ALT  12  /  AlkPhos  94            Urinalysis Basic - ( 29 Sep 2018 16:37 )    Color: YELLOW / Appearance: CLEAR / S.026 / pH: 6.5  Gluc: NEGATIVE / Ketone: TRACE  / Bili: NEGATIVE / Urobili: NORMAL   Blood: NEGATIVE / Protein: 30 / Nitrite: NEGATIVE   Leuk Esterase: NEGATIVE / RBC: 0-2 / WBC 0-2   Sq Epi: OCC / Non Sq Epi: x / Bacteria: NEGATIVE        RADIOLOGY & ADDITIONAL TESTS:    Imaging Personally Reviewed:  Consultant(s) Notes Reviewed:    Care Discussed with Consultants/Other Providers: Patient is a 73y old  Male who presents with a chief complaint of CP (29 Sep 2018 21:14)    SUBJECTIVE / OVERNIGHT EVENTS:  Patient seen and exam this morning. Patient's son present at bedside. Patient feels at his baseline and asking if he can go home. He states that he never had chest pain. Patient's son mentions that patient has not been taking Nexium because he had no more meds. Patient has known history of gastritis. He does have cough and mentions stuffy nose. No chest pain currently. No SOB and pt denies n/v or abdominal pain. No hx of black or bloody stools.    MEDICATIONS  (STANDING):  buDESOnide 160 MICROgram(s)/formoterol 4.5 MICROgram(s) Inhaler 2 Puff(s) Inhalation two times a day  dextrose 5%. 1000 milliLiter(s) (50 mL/Hr) IV Continuous <Continuous>  dextrose 50% Injectable 12.5 Gram(s) IV Push once  dextrose 50% Injectable 25 Gram(s) IV Push once  dextrose 50% Injectable 25 Gram(s) IV Push once  insulin glargine Injectable (LANTUS) 18 Unit(s) SubCutaneous at bedtime  insulin lispro (HumaLOG) corrective regimen sliding scale   SubCutaneous three times a day before meals  insulin lispro (HumaLOG) corrective regimen sliding scale   SubCutaneous at bedtime  losartan 100 milliGRAM(s) Oral daily  montelukast 10 milliGRAM(s) Oral daily  pantoprazole    Tablet 40 milliGRAM(s) Oral before breakfast  tamsulosin 0.4 milliGRAM(s) Oral at bedtime    MEDICATIONS  (PRN):  aluminum hydroxide/magnesium hydroxide/simethicone Suspension 30 milliLiter(s) Oral every 4 hours PRN Dyspepsia  benzonatate 100 milliGRAM(s) Oral three times a day PRN Cough  dextrose 40% Gel 15 Gram(s) Oral once PRN Blood Glucose LESS THAN 70 milliGRAM(s)/deciliter  glucagon  Injectable 1 milliGRAM(s) IntraMuscular once PRN Glucose LESS THAN 70 milligrams/deciliter      Vital Signs Last 24 Hrs  T(C): 36.7 (30 Sep 2018 05:45), Max: 36.7 (30 Sep 2018 05:45)  T(F): 98 (30 Sep 2018 05:45), Max: 98 (30 Sep 2018 05:45)  HR: 77 (30 Sep 2018 05:45) (71 - 78)  BP: 136/63 (30 Sep 2018 05:45) (136/63 - 150/65)  BP(mean): --  RR: 16 (30 Sep 2018 05:45) (16 - 18)  SpO2: 99% (30 Sep 2018 05:45) (98% - 100%)  CAPILLARY BLOOD GLUCOSE      POCT Blood Glucose.: 178 mg/dL (30 Sep 2018 10:17)  POCT Blood Glucose.: 168 mg/dL (29 Sep 2018 22:08)  POCT Blood Glucose.: 121 mg/dL (29 Sep 2018 18:07)    I&O's Summary    29 Sep 2018 07:01  -  30 Sep 2018 07:00  --------------------------------------------------------  IN: 400 mL / OUT: 0 mL / NET: 400 mL    30 Sep 2018 07:01  -  30 Sep 2018 11:51  --------------------------------------------------------  IN: 120 mL / OUT: 0 mL / NET: 120 mL          PHYSICAL EXAM  GENERAL: Well appearing in NAD, morbidly obese  CHEST/LUNG: Clear to auscultation bilaterally; No wheeze  HEART: Regular rate and rhythm; No murmurs, rubs, or gallops  ABDOMEN: Protuberant, Soft, Nontender, Nondistended; Bowel sounds present  EXTREMITIES:  2+ Peripheral Pulses, No clubbing, cyanosis, or edema  PSYCH: AAOx3      LABS:                        11.3   14.00 )-----------( 331      ( 30 Sep 2018 05:52 )             37.6     09-30    133<L>  |  95<L>  |  19  ----------------------------<  161<H>  3.9   |  27  |  1.08    Ca    9.2      30 Sep 2018 05:52  Phos  2.7     09-30  Mg     2.5     09-30    TPro  7.3  /  Alb  4.0  /  TBili  0.6  /  DBili  x   /  AST  18  /  ALT  12  /  AlkPhos  94  09-30      RADIOLOGY & ADDITIONAL TESTS:    09/30/2018: PHARMACOLOGIC  NUCLEAR STRESS TEST  23.5 mCi of Tc99m Sestamibi were injected during stress  protocol. Approximately 45 minutes later, tomographic  images were obtained in a 180 degree arc from right  anterior oblique to left anterior oblique with 64 stops.  The tomographic slices were reconstructed in 3 orthogonal  planes (short axis, horizontal long axis and vertical long  axis).  Interpretation was performed both by visual and  quantitative analysis.  Stress images were acquired using CZT-based system with  pinhole collimation (GageIn 530 c, [place]),  and reconstructed using MLEM algorithm. Images were  re-acquired with the patient in a prone position.  ------------------------------------------------------------------------  NUCLEAR FINDINGS:  The left ventricle was normal in size. Normal myocardial  perfusion scan,with no evidence of infarction or inducible  ischemia.  ------------------------------------------------------------------------  GATED ANALYSIS:  Post-stress gated wall motion analysis was performed (LVEF  > 70%;LVEDV = 51 ml.)  ------------------------------------------------------------------------  IMPRESSIONS:Normal Study  * The left ventricle was normal in size.  * Tracer uptake was homogeneous throughout the left  ventricle.  * Normal study; no evidence for myocardial infarction or  ischemia.  * Gated wall motion analysis was performed, and shows  normal wall motion.        Care Discussed with Consultants/Other Providers:

## 2018-09-30 NOTE — DISCHARGE NOTE ADULT - MEDICATION SUMMARY - MEDICATIONS TO TAKE
I will START or STAY ON the medications listed below when I get home from the hospital:    olmesartan 40 mg oral tablet  -- 1 tab(s) by mouth once a day  -- Indication: For HTN    tamsulosin 0.4 mg oral capsule  -- 1 cap(s) by mouth once a day  -- Indication: For BPH    Lantus 100 units/mL subcutaneous solution  -- 18 unit(s) subcutaneous once a day (at bedtime)   -- Do not drink alcoholic beverages when taking this medication.  It is very important that you take or use this exactly as directed.  Do not skip doses or discontinue unless directed by your doctor.  Keep in refrigerator.  Do not freeze.    -- Indication: For DM    loratadine 10 mg oral tablet, disintegrating  -- 1 tab(s) by mouth once a day, As needed, pruritus  -- Indication: For allergies    Advair Diskus 250 mcg-50 mcg inhalation powder  -- 1 puff(s) inhaled 2 times a day   -- Check with your doctor before becoming pregnant.  For inhalation only.  Obtain medical advice before taking any non-prescription drugs as some may affect the action of this medication.  Rinse mouth thoroughly after use.    -- Indication: For COPD    MiraLax oral powder for reconstitution  -- 1 gram(s) by mouth once a day, As Needed for  constipation  -- Dilute this medication with liquid before administration.  It is very important that you take or use this exactly as directed.  Do not skip doses or discontinue unless directed by your doctor.    -- Indication: For Constipation    Singulair 10 mg oral tablet  -- 1 tab(s) by mouth once a day  -- Indication: For COPD    fluticasone 50 mcg/inh nasal spray  -- 1 spray(s) intranasally once a day   -- For the nose.  It is very important that you take or use this exactly as directed.  Do not skip doses or discontinue unless directed by your doctor.    -- Indication: For Nasal spray    omeprazole 40 mg oral delayed release capsule  -- 1 cap(s) by mouth once a day   -- It is very important that you take or use this exactly as directed.  Do not skip doses or discontinue unless directed by your doctor.  Obtain medical advice before taking any non-prescription drugs as some may affect the action of this medication.  Swallow whole.  Do not crush.    -- Indication: For GERD

## 2018-09-30 NOTE — DISCHARGE NOTE ADULT - CARE PLAN
Principal Discharge DX:	Atypical chest pain  Goal:	prevent further episodes  Assessment and plan of treatment:	follow up with your PMD in one week - call for appointment

## 2018-09-30 NOTE — PROGRESS NOTE ADULT - ASSESSMENT
Pt 74yo male with Hx as above admitted to [facility] for C/P. While in triage Pt was noted with BP of 191/88 with HR 95 RR 16 98% on RA. Pt also C/O nausea and abdominal discomfort. Pt received Dose of Pepcid as well as raglan IV with Improvement of Symptoms. Pt BP and HR improved itself to 150/64 HR 73 RR 17 99% on RA and afebrile. On exam Pt was noted with unilateral crackles, and mild abdominal tendrness but no rebound or guarding rest of exam was benign with no neurological deficit . CE x 2 and ECG are nonischemic and CXR was unrevealing. Bed side SONO revealed Preserved LVF and RVF with collapsed IVC, and dry lungs   Pt was noted with WBC 20 also with BUN Cr 25/1 with Serum Bicarb of 20 AG 17 and trace of ketones on UA (UA normal) Serum Glucose 100, Na 134 Cl 97 Mr. Pimentel is a 72 yo man with hx of HTN, insulin dependent DM2, COPD, and BPH presenting with acute nausea and vomiting, now resolved, but admitted for concern for atypical chest pain. Acute cardiac ischemia was ruled out by negative cardiac enzymes and unremarkable EKG. Pt noted to have leukocytosis, likely secondary to recent oral steroid use, which is improving spontaneously and stable microcytic anemia. Pt currently hemodynamically stable and at baseline functional status.

## 2018-09-30 NOTE — PROGRESS NOTE ADULT - PROBLEM SELECTOR PROBLEM 4
Type 2 diabetes mellitus with complication, unspecified whether long term insulin use Chronic obstructive pulmonary disease, unspecified COPD type

## 2018-09-30 NOTE — DISCHARGE NOTE ADULT - HOSPITAL COURSE
Mr. Pimentel is a 72 yo man with hx of HTN, insulin dependent DM2, COPD, and BPH presenting with acute nausea and vomiting, now resolved, but admitted for concern for atypical chest pain. Acute cardiac ischemia was ruled out by negative cardiac enzymes and unremarkable EKG. Pt noted to have leukocytosis, likely secondary to recent oral steroid use, which is improving spontaneously and stable microcytic anemia. Pt currently hemodynamically stable and at baseline functional status.      Problem/Plan - 1:  ·  Problem: Non-intractable vomiting with nausea, unspecified vomiting type. Plan: Likely due to exacerbation of underlying gastritis. Pt recently on oral steroids and not on PPI for days due to not have medication at home.   - discharge pt on omeprazole 20mg daily  - continue pantoprazole 40mg daily while inpatient.     Problem/Plan - 2:  ·  Problem: Chest pain, unspecified type. Plan: Pt denies report of chest pain. Cardiac monitor revealed sinus rhythm with HR mainly in 70s-80s. ACS not likely. Stress test normal r/o clinical ischemic heart disease.   - d/c telemetry/cardiac monitoring  - no need for further inpatient w/u.     Problem/Plan - 3:  ·  Problem: Cough. Plan: Maybe 2/2 to lingering viral infection, but may also be due to underlying reflux +/- post-nasal drip  - treat gastritis/dyspepsia with PPI as described above  - start trial of fluticasone nasal spray 2 sprays per nostril once daily  - Tessalon perle 100mg TID PRN.     Problem/Plan - 4:  ·  Problem: Chronic obstructive pulmonary disease, unspecified COPD type. Plan: No S/S of exacerbation.   - Advair and Singulair, NEBS as needed.     Problem/Plan - 5:  ·  Problem: Essential hypertension.  Plan: - Will resume home ARBS, but will hold lasix and Spironolactone for now (in light of BUN SCr ratio and Hyponatremia)  - Will ECHO in am to assess LVF  - Will monitor BP If still elevated will consider Atenolol for BP.      Problem/Plan - 6:  Problem: Type 2 diabetes mellitus with complication, unspecified whether long term insulin use. Plan: Last A1C was 1 Week ago 9.8, at present Pt serum glucose in low 100s   - continue home regimen of lantus 18 units at bedtime.  - pt will need to establish care with PMD once he obtains insurance.     Problem/Plan - 7:  ·  Problem: Microcytic anemia.  Plan: Unclear etiology - possible nutritional etiology, such as iron deficiency vs. hemoglobinopathy, such as thalassemia. Hb/Hct stable with no signs of acute bleeding, or black stools. Pt will need to f/u with PMD for further w/u once he establishes medical care. As per pt's son, he is in the process of obtaining insurance.    DISPO: Patient is medically stable to be discharged home today.     Discussed with MD - pt stable for discharge home, pt with no complaints, discharge medications reviewed with MD.

## 2018-09-30 NOTE — PROGRESS NOTE ADULT - PROBLEM SELECTOR PLAN 1
On TELE for now, Pt had negative W/U In [place] 1y ago, now with N/V abdominal discomfort, but no exertional complaints and nonischemic CE and ECG, more inclined to believe that symptoms are GI related, possibly from recent steroid use for viral infection.  - Will c/w TELE   - Serial EKG and Yumiko if pt has recurrence of chest pain  - Monitor electrolytes  - Will keep pt on PPI for now. Will F/U Amylase and Lipase, Lactate, CBC, stool for occult blood   - Will reintroduce PO intake and if GI symptoms continue to persist will call GI HS in am. Will also consider CTH to r/o vertebrobasilar insufficiency causing dizziness, nausea, and vomiting if these symptoms persist.  - Given risk factors ( and no PMD ), will do ECHO and possible STRESS tomorrow.   - Will Hold Diuretics for now, (unclear why Pt is on Diuretics) and will monitor SCr  - Elevated WBC likely steroids with hemoconcentration, Pt is afebrile with No obvious source . Will observe for now.  - Pt was noted with AG of 17 with trace ketones in urine, will F/U Lactate for now and repeat BMP Likely due to exacerbation of underlying gastritis. Pt recently on oral steroids and not on PPI for days due to not have medication at home.   - discharge pt on omeprazole 20mg daily  - continue pantoprazole 40mg daily while inpatient

## 2018-09-30 NOTE — PROGRESS NOTE ADULT - PROBLEM SELECTOR PLAN 7
Unclear etiology - possible nutritional etiology, such as iron deficiency vs. hemoglobinopathy, such as thalassemia. Hb/Hct stable with no signs of acute bleeding, or black stools. Pt will need to f/u with PMD for further w/u once he establishes medical care. As per pt's son, he is in the process of obtaining insurance.    DISPO: Patient is medically stable to be discharged home today.

## 2018-09-30 NOTE — DISCHARGE NOTE ADULT - PATIENT PORTAL LINK FT
You can access the [facility] Portal, offered by [facility], by registering with the following website: [email or web address]

## 2018-09-30 NOTE — PROGRESS NOTE ADULT - PROBLEM SELECTOR PLAN 2
No S/S of exacerbation.   - Will resume Advair and Singulair, NEBS as needed Pt denies report of chest pain. Cardiac monitor revealed sinus rhythm with HR mainly in 70s-80s. ACS not likely. Stress test normal r/o clinical ischemic heart disease.   - d/c telemetry/cardiac monitoring  - no need for further inpatient w/u.

## 2018-09-30 NOTE — PROGRESS NOTE ADULT - PROBLEM SELECTOR PLAN 4
Last A1C was 1 Week ago 9.8, at present Pt serum glucose in low 100s will resume Current dose of lantus as pt had food from outside in the ED and was able to tolerate No S/S of exacerbation.   - Advair and Singulair, NEBS as needed

## 2018-09-30 NOTE — DISCHARGE NOTE ADULT - MEDICATION SUMMARY - MEDICATIONS TO STOP TAKING
I will STOP taking the medications listed below when I get home from the hospital:    NexIUM  -- 20 milligram(s) by mouth once a day    Lasix 40 mg oral tablet  -- 1 tab(s) by mouth once a day   -- Avoid prolonged or excessive exposure to direct and/or artificial sunlight while taking this medication.  It is very important that you take or use this exactly as directed.  Do not skip doses or discontinue unless directed by your doctor.  It may be advisable to drink a full glass orange juice or eat a banana daily while taking this medication.    spironolactone 50 mg oral tablet  -- 1 tab(s) by mouth once a day

## 2018-09-30 NOTE — PROGRESS NOTE ADULT - PROBLEM SELECTOR PLAN 6
Last A1C was 1 Week ago 9.8, at present Pt serum glucose in low 100s   - continue home regimen of lantus 18 units at bedtime.  - pt will need to establish care with PMD once he obtains insurance.

## 2018-09-30 NOTE — PROGRESS NOTE ADULT - PROBLEM SELECTOR PLAN 5
- Will resume home ARBS, but will hold lasix and Spironolactone for now (in light of BUN SCr ratio and Hyponatremia)  - Will ECHO in am to assess LVF  - Will monitor BP If still elevated will consider Atenolol for BP

## 2021-12-13 NOTE — PROGRESS NOTE ADULT - RESPIRATORY AND THORAX
Refill Request     Last Seen: Last Seen Department: 7/8/2021  Last Seen by PCP: 7/8/21     Last Written: 9/16/21 90 tablet 3 refill     Next Appointment:   Future Appointments   Date Time Provider Irene Rich   3/14/2022  3:00 PM Lakeisha Lucas MD [facility]       Message to  to schedule appointment.          Requested Prescriptions     Pending Prescriptions Disp Refills    cetirizine (ZYRTEC) 10 MG tablet [Pharmacy Med Name: CETIRIZINE 10MG TABLETS] 90 tablet 3     Sig: TAKE 1 TABLET BY MOUTH EVERY DAY details…

## 2024-11-11 NOTE — PATIENT PROFILE ADULT. - AS SC BRADEN ACTIVITY
Physical Therapy                 Therapy Communication Note    Patient Name: Isa Pleitez  MRN: [id number]  Department: [facility]  Room: 6014/6014-A  Today's Date: 11/11/2024     Discipline: Physical Therapy    Missed Visit Reason: Missed Visit Reason: Other (Comment)    Missed Time: Attempt    Comment:  Updated code status and pt/family pursuing hospice. Will DC PT orders at this time.    (4) walks frequently